# Patient Record
Sex: MALE | Race: BLACK OR AFRICAN AMERICAN | Employment: OTHER | URBAN - METROPOLITAN AREA
[De-identification: names, ages, dates, MRNs, and addresses within clinical notes are randomized per-mention and may not be internally consistent; named-entity substitution may affect disease eponyms.]

---

## 2018-03-22 ENCOUNTER — OP HISTORICAL/CONVERTED ENCOUNTER (OUTPATIENT)
Dept: OTHER | Age: 78
End: 2018-03-22

## 2019-10-18 ENCOUNTER — OP HISTORICAL/CONVERTED ENCOUNTER (OUTPATIENT)
Dept: OTHER | Age: 79
End: 2019-10-18

## 2020-08-04 DIAGNOSIS — N40.1 BENIGN PROSTATIC HYPERPLASIA WITH LOWER URINARY TRACT SYMPTOMS: ICD-10-CM

## 2020-08-10 VITALS
HEART RATE: 69 BPM | OXYGEN SATURATION: 98 % | SYSTOLIC BLOOD PRESSURE: 163 MMHG | DIASTOLIC BLOOD PRESSURE: 61 MMHG | HEIGHT: 72 IN | BODY MASS INDEX: 19.91 KG/M2 | WEIGHT: 147 LBS | TEMPERATURE: 97.8 F | RESPIRATION RATE: 17 BRPM

## 2020-08-10 PROBLEM — M75.120 FULL THICKNESS ROTATOR CUFF TEAR: Status: ACTIVE | Noted: 2017-06-05

## 2020-08-10 PROBLEM — N40.0 BENIGN PROSTATIC HYPERPLASIA: Status: ACTIVE | Noted: 2017-06-05

## 2020-08-10 PROBLEM — H40.9 GLAUCOMA OF LEFT EYE: Status: ACTIVE | Noted: 2017-12-29

## 2020-08-10 PROBLEM — I10 ESSENTIAL HYPERTENSION: Status: ACTIVE | Noted: 2017-06-05

## 2020-08-10 PROBLEM — K40.90 LEFT INGUINAL HERNIA: Status: ACTIVE | Noted: 2017-06-05

## 2020-08-10 RX ORDER — LISINOPRIL AND HYDROCHLOROTHIAZIDE 10; 12.5 MG/1; MG/1
1 TABLET ORAL DAILY
COMMUNITY
Start: 2020-07-16 | End: 2020-08-14 | Stop reason: SDUPTHER

## 2020-08-10 RX ORDER — DUTASTERIDE AND TAMSULOSIN HYDROCHLORIDE CAPSULES .5; .4 MG/1; MG/1
CAPSULE ORAL
COMMUNITY
Start: 2020-05-06 | End: 2020-09-10 | Stop reason: SDUPTHER

## 2020-08-11 ENCOUNTER — OFFICE VISIT (OUTPATIENT)
Dept: PRIMARY CARE CLINIC | Age: 80
End: 2020-08-11
Payer: MEDICARE

## 2020-08-11 VITALS
DIASTOLIC BLOOD PRESSURE: 75 MMHG | TEMPERATURE: 97.4 F | HEART RATE: 74 BPM | HEIGHT: 72 IN | BODY MASS INDEX: 19.81 KG/M2 | RESPIRATION RATE: 18 BRPM | OXYGEN SATURATION: 99 % | WEIGHT: 146.25 LBS | SYSTOLIC BLOOD PRESSURE: 146 MMHG

## 2020-08-11 DIAGNOSIS — M79.641 RIGHT HAND PAIN: ICD-10-CM

## 2020-08-11 DIAGNOSIS — I10 ESSENTIAL HYPERTENSION: Primary | ICD-10-CM

## 2020-08-11 DIAGNOSIS — Z12.5 SCREENING FOR PROSTATE CANCER: ICD-10-CM

## 2020-08-11 DIAGNOSIS — M79.671 PAIN IN BOTH FEET: ICD-10-CM

## 2020-08-11 DIAGNOSIS — M79.672 PAIN IN BOTH FEET: ICD-10-CM

## 2020-08-11 DIAGNOSIS — N40.1 BENIGN PROSTATIC HYPERPLASIA WITH LOWER URINARY TRACT SYMPTOMS, SYMPTOM DETAILS UNSPECIFIED: ICD-10-CM

## 2020-08-11 PROCEDURE — G8753 SYS BP > OR = 140: HCPCS | Performed by: FAMILY MEDICINE

## 2020-08-11 PROCEDURE — G8510 SCR DEP NEG, NO PLAN REQD: HCPCS | Performed by: FAMILY MEDICINE

## 2020-08-11 PROCEDURE — 99214 OFFICE O/P EST MOD 30 MIN: CPT | Performed by: FAMILY MEDICINE

## 2020-08-11 PROCEDURE — G8420 CALC BMI NORM PARAMETERS: HCPCS | Performed by: FAMILY MEDICINE

## 2020-08-11 PROCEDURE — G8754 DIAS BP LESS 90: HCPCS | Performed by: FAMILY MEDICINE

## 2020-08-11 PROCEDURE — G8427 DOCREV CUR MEDS BY ELIG CLIN: HCPCS | Performed by: FAMILY MEDICINE

## 2020-08-11 RX ORDER — GABAPENTIN 100 MG/1
2 CAPSULE ORAL DAILY
COMMUNITY

## 2020-08-11 RX ORDER — ASPIRIN 81 MG/1
81 TABLET ORAL DAILY
COMMUNITY

## 2020-08-11 NOTE — PROGRESS NOTES
Chester Fajardo is a [de-identified] y.o. male who presents today for the following:  Chief Complaint   Patient presents with    Hypertension    Medication Refill    Labs    Hand Injury     States was cutting grass with push mower about 6 weeks ago and mower got stuck in hole and when pulling mower out he hurt his right hand. This patient comes in today for follow up of the following medical conditions: BPH and Hypertension They also have some new problems including: pain in both feet when he is in the cold. He he was mowing his lawn and had his right finger pulled and he has had tenderness in the right index finger. No Known Allergies    Current Outpatient Medications   Medication Sig    aspirin delayed-release 81 mg tablet Take 81 mg by mouth daily.  Dutasteride-Tamsulosin 0.5-0.4 mg CM24 TAKE 1 CAPSULE BY MOUTH EVERY DAY    lisinopril-hydroCHLOROthiazide (PRINZIDE, ZESTORETIC) 10-12.5 mg per tablet Take 1 Tab by mouth daily.  gabapentin (NEURONTIN) 100 mg capsule gabapentin 100 mg capsule     No current facility-administered medications for this visit.         Past Medical History:   Diagnosis Date    Benign prostatic hyperplasia 6/5/2017    Essential hypertension 6/5/2017    Full thickness rotator cuff tear 6/5/2017    Glaucoma of left eye 12/29/2017    Left inguinal hernia 6/5/2017       Past Surgical History:   Procedure Laterality Date    HX CHOLECYSTECTOMY      HX HERNIA REPAIR  05/31/2016    HX ORTHOPAEDIC Left     Shoulder    HX ORTHOPAEDIC Right     Shoulder    HX ORTHOPAEDIC Right 03/22/2018    Hammer toe surgery        Family History   Problem Relation Age of Onset    Heart Disease Mother     Cancer Brother        Social History     Socioeconomic History    Marital status:      Spouse name: Not on file    Number of children: Not on file    Years of education: Not on file    Highest education level: Not on file   Occupational History    Not on file   Social Needs    Financial resource strain: Not on file    Food insecurity     Worry: Not on file     Inability: Not on file    Transportation needs     Medical: Not on file     Non-medical: Not on file   Tobacco Use    Smoking status: Former Smoker    Smokeless tobacco: Never Used   Substance and Sexual Activity    Alcohol use: Never     Frequency: Never    Drug use: Never    Sexual activity: Not on file   Lifestyle    Physical activity     Days per week: Not on file     Minutes per session: Not on file    Stress: Not on file   Relationships    Social connections     Talks on phone: Not on file     Gets together: Not on file     Attends Orthodox service: Not on file     Active member of club or organization: Not on file     Attends meetings of clubs or organizations: Not on file     Relationship status: Not on file    Intimate partner violence     Fear of current or ex partner: Not on file     Emotionally abused: Not on file     Physically abused: Not on file     Forced sexual activity: Not on file   Other Topics Concern    Not on file   Social History Narrative    Not on file         Review of Systems   Constitutional: Negative. Respiratory: Negative. Cardiovascular: Negative. Gastrointestinal: Negative. Genitourinary: Negative. Musculoskeletal: Positive for joint pain (Right index finger and both feet). Skin: Negative. Neurological: Negative. Psychiatric/Behavioral: Negative. All other systems reviewed and are negative. Visit Vitals  /75 (BP 1 Location: Right arm, BP Patient Position: Sitting)   Pulse 74   Temp 97.4 °F (36.3 °C) (Oral)   Resp 18   Ht 6' (1.829 m)   Wt 146 lb 4 oz (66.3 kg)   SpO2 99%   BMI 19.84 kg/m²     Physical Exam  Vitals signs and nursing note reviewed. Constitutional:       Appearance: Normal appearance. He is normal weight. HENT:      Head: Normocephalic and atraumatic. Neck:      Musculoskeletal: Normal range of motion and neck supple. Cardiovascular:      Rate and Rhythm: Normal rate and regular rhythm. Pulses: Normal pulses. Heart sounds: Normal heart sounds. Pulmonary:      Effort: Pulmonary effort is normal.      Breath sounds: Normal breath sounds. Abdominal:      General: Abdomen is flat. Bowel sounds are normal.      Palpations: Abdomen is soft. Musculoskeletal: Normal range of motion. Skin:     General: Skin is warm and dry. Capillary Refill: Capillary refill takes less than 2 seconds. Neurological:      General: No focal deficit present. Mental Status: He is alert. Sensory: Sensation is intact. Motor: Motor function is intact. Psychiatric:         Mood and Affect: Mood normal.         Behavior: Behavior normal.         Thought Content: Thought content normal.         Judgment: Judgment normal.              1. Essential hypertension  Blood pressure is controlled and he is taking his medication without side effect    2. Benign prostatic hyperplasia with lower urinary tract symptoms, symptom details unspecified  The dual medication we have been using has helped his symptoms and he has no problem urinating. 3. Pain in both feet  I checked his feet and the pulse is good and sensation is good. At this point he may simply be having cold feet when the weather is cold. Suggest that he keep socks on when he is home and to call if the symptoms worsen.   Since the gabapentin does help the discomfort there may be some relationship to his lumbar radiculopathy

## 2020-08-11 NOTE — PATIENT INSTRUCTIONS
High Blood Pressure: Care Instructions Overview It's normal for blood pressure to go up and down throughout the day. But if it stays up, you have high blood pressure. Another name for high blood pressure is hypertension. Despite what a lot of people think, high blood pressure usually doesn't cause headaches or make you feel dizzy or lightheaded. It usually has no symptoms. But it does increase your risk of stroke, heart attack, and other problems. You and your doctor will talk about your risks of these problems based on your blood pressure. Your doctor will give you a goal for your blood pressure. Your goal will be based on your health and your age. Lifestyle changes, such as eating healthy and being active, are always important to help lower blood pressure. You might also take medicine to reach your blood pressure goal. 
Follow-up care is a key part of your treatment and safety. Be sure to make and go to all appointments, and call your doctor if you are having problems. It's also a good idea to know your test results and keep a list of the medicines you take. How can you care for yourself at home? Medical treatment · If you stop taking your medicine, your blood pressure will go back up. You may take one or more types of medicine to lower your blood pressure. Be safe with medicines. Take your medicine exactly as prescribed. Call your doctor if you think you are having a problem with your medicine. · Talk to your doctor before you start taking aspirin every day. Aspirin can help certain people lower their risk of a heart attack or stroke. But taking aspirin isn't right for everyone, because it can cause serious bleeding. · See your doctor regularly. You may need to see the doctor more often at first or until your blood pressure comes down. · If you are taking blood pressure medicine, talk to your doctor before you take decongestants or anti-inflammatory medicine, such as ibuprofen. Some of these medicines can raise blood pressure. · Learn how to check your blood pressure at home. Lifestyle changes · Stay at a healthy weight. This is especially important if you put on weight around the waist. Losing even 10 pounds can help you lower your blood pressure. · If your doctor recommends it, get more exercise. Walking is a good choice. Bit by bit, increase the amount you walk every day. Try for at least 30 minutes on most days of the week. You also may want to swim, bike, or do other activities. · Avoid or limit alcohol. Talk to your doctor about whether you can drink any alcohol. · Try to limit how much sodium you eat to less than 2,300 milligrams (mg) a day. Your doctor may ask you to try to eat less than 1,500 mg a day. · Eat plenty of fruits (such as bananas and oranges), vegetables, legumes, whole grains, and low-fat dairy products. · Lower the amount of saturated fat in your diet. Saturated fat is found in animal products such as milk, cheese, and meat. Limiting these foods may help you lose weight and also lower your risk for heart disease. · Do not smoke. Smoking increases your risk for heart attack and stroke. If you need help quitting, talk to your doctor about stop-smoking programs and medicines. These can increase your chances of quitting for good. When should you call for help? Call  911 anytime you think you may need emergency care. This may mean having symptoms that suggest that your blood pressure is causing a serious heart or blood vessel problem. Your blood pressure may be over 180/120. For example, call 911 if: 
· You have symptoms of a heart attack. These may include: 
? Chest pain or pressure, or a strange feeling in the chest. 
? Sweating. ? Shortness of breath. ? Nausea or vomiting. ? Pain, pressure, or a strange feeling in the back, neck, jaw, or upper belly or in one or both shoulders or arms. ? Lightheadedness or sudden weakness. ? A fast or irregular heartbeat. · You have symptoms of a stroke. These may include: 
? Sudden numbness, tingling, weakness, or loss of movement in your face, arm, or leg, especially on only one side of your body. ? Sudden vision changes. ? Sudden trouble speaking. ? Sudden confusion or trouble understanding simple statements. ? Sudden problems with walking or balance. ? A sudden, severe headache that is different from past headaches. · You have severe back or belly pain. Do not wait until your blood pressure comes down on its own. Get help right away. Call your doctor now or seek immediate care if: 
· Your blood pressure is much higher than normal (such as 180/120 or higher), but you don't have symptoms. · You think high blood pressure is causing symptoms, such as: 
? Severe headache. 
? Blurry vision. Watch closely for changes in your health, and be sure to contact your doctor if: 
· Your blood pressure measures higher than your doctor recommends at least 2 times. That means the top number is higher or the bottom number is higher, or both. · You think you may be having side effects from your blood pressure medicine. Where can you learn more? Go to http://www.gray.com/ Enter H809 in the search box to learn more about \"High Blood Pressure: Care Instructions. \" Current as of: December 16, 2019               Content Version: 12.5 © 0581-0781 [employer]. Care instructions adapted under license by [employer] (which disclaims liability or warranty for this information). If you have questions about a medical condition or this instruction, always ask your healthcare professional. [place] [address] any warranty or liability for your use of this information. Foot Pain: Care Instructions Your Care Instructions Foot injuries that cause pain and swelling are fairly common.  Almost all sports or home repair projects can cause a misstep that ends up as foot pain. Normal wear and tear, especially as you get older, also can cause foot pain. Most minor foot injuries will heal on their own, and home treatment is usually all you need to do. If you have a severe injury, you may need tests and treatment. Follow-up care is a key part of your treatment and safety. Be sure to make and go to all appointments, and call your doctor if you are having problems. It's also a good idea to know your test results and keep a list of the medicines you take. How can you care for yourself at home? · Take pain medicines exactly as directed. ? If the doctor gave you a prescription medicine for pain, take it as prescribed. ? If you are not taking a prescription pain medicine, ask your doctor if you can take an over-the-counter medicine. · Rest and protect your foot. Take a break from any activity that may cause pain. · Put ice or a cold pack on your foot for 10 to 20 minutes at a time. Put a thin cloth between the ice and your skin. · Prop up the sore foot on a pillow when you ice it or anytime you sit or lie down during the next 3 days. Try to keep it above the level of your heart. This will help reduce swelling. · Your doctor may recommend that you wrap your foot with an elastic bandage. Keep your foot wrapped for as long as your doctor advises. · If your doctor recommends crutches, use them as directed. · Wear roomy footwear. · As soon as pain and swelling end, begin gentle exercises of your foot. Your doctor can tell you which exercises will help. When should you call for help? [id number] anytime you think you may need emergency care. For example, call if: 
· Your foot turns pale, white, blue, or cold. Call your doctor now or seek immediate medical care if: 
· You cannot move or stand on your foot. · Your foot looks twisted or out of its normal position. · Your foot is not stable when you step down. · You have signs of infection, such as: 
? Increased pain, swelling, warmth, or redness. ? Red streaks leading from the sore area. ? Pus draining from a place on your foot. ? A fever. · Your foot is numb or tingly. Watch closely for changes in your health, and be sure to contact your doctor if: 
· You do not get better as expected. · You have bruises from an injury that last longer than 2 weeks. Where can you learn more? Go to http://yanna-teresa.info/ Enter I851 in the search box to learn more about \"Foot Pain: Care Instructions. \" Current as of: March 2, 2020               Content Version: 12.5 © 1568-2514 [employer]wise[employer]. Care instructions adapted under license by [employer] (which disclaims liability or warranty for this information). If you have questions about a medical condition or this instruction, always ask your healthcare professional. [address] any warranty or liability for your use of this information.

## 2020-08-12 LAB
ALBUMIN SERPL-MCNC: 4.4 G/DL (ref 3.7–4.7)
ALBUMIN/GLOB SERPL: 2.2 {RATIO} (ref 1.2–2.2)
ALP SERPL-CCNC: 71 IU/L (ref 39–117)
ALT SERPL-CCNC: 6 IU/L (ref 0–44)
APPEARANCE UR: CLEAR
AST SERPL-CCNC: 15 IU/L (ref 0–40)
BASOPHILS # BLD AUTO: 0 X10E3/UL (ref 0–0.2)
BASOPHILS NFR BLD AUTO: 1 %
BILIRUB SERPL-MCNC: 0.4 MG/DL (ref 0–1.2)
BILIRUB UR QL STRIP: NEGATIVE
BUN SERPL-MCNC: 14 MG/DL (ref 8–27)
BUN/CREAT SERPL: 13 (ref 10–24)
CALCIUM SERPL-MCNC: 10 MG/DL (ref 8.6–10.2)
CHLORIDE SERPL-SCNC: 99 MMOL/L (ref 96–106)
CHOLEST SERPL-MCNC: 166 MG/DL (ref 100–199)
CO2 SERPL-SCNC: 26 MMOL/L (ref 20–29)
COLOR UR: YELLOW
CREAT SERPL-MCNC: 1.1 MG/DL (ref 0.76–1.27)
EOSINOPHIL # BLD AUTO: 0.1 X10E3/UL (ref 0–0.4)
EOSINOPHIL NFR BLD AUTO: 3 %
ERYTHROCYTE [DISTWIDTH] IN BLOOD BY AUTOMATED COUNT: 13 % (ref 11.6–15.4)
GLOBULIN SER CALC-MCNC: 2 G/DL (ref 1.5–4.5)
GLUCOSE SERPL-MCNC: 101 MG/DL (ref 65–99)
GLUCOSE UR QL: NEGATIVE
HCT VFR BLD AUTO: 42.3 % (ref 37.5–51)
HDLC SERPL-MCNC: 51 MG/DL
HGB BLD-MCNC: 14.4 G/DL (ref 13–17.7)
HGB UR QL STRIP: NEGATIVE
IMM GRANULOCYTES # BLD AUTO: 0 X10E3/UL (ref 0–0.1)
IMM GRANULOCYTES NFR BLD AUTO: 0 %
KETONES UR QL STRIP: NEGATIVE
LDLC SERPL CALC-MCNC: 92 MG/DL (ref 0–99)
LEUKOCYTE ESTERASE UR QL STRIP: NEGATIVE
LYMPHOCYTES # BLD AUTO: 0.7 X10E3/UL (ref 0.7–3.1)
LYMPHOCYTES NFR BLD AUTO: 18 %
MCH RBC QN AUTO: 31.4 PG (ref 26.6–33)
MCHC RBC AUTO-ENTMCNC: 34 G/DL (ref 31.5–35.7)
MCV RBC AUTO: 92 FL (ref 79–97)
MICRO URNS: NORMAL
MONOCYTES # BLD AUTO: 0.4 X10E3/UL (ref 0.1–0.9)
MONOCYTES NFR BLD AUTO: 11 %
NEUTROPHILS # BLD AUTO: 2.7 X10E3/UL (ref 1.4–7)
NEUTROPHILS NFR BLD AUTO: 67 %
NITRITE UR QL STRIP: NEGATIVE
PH UR STRIP: 7.5 [PH] (ref 5–7.5)
PLATELET # BLD AUTO: 162 X10E3/UL (ref 150–450)
POTASSIUM SERPL-SCNC: 4.3 MMOL/L (ref 3.5–5.2)
PROT SERPL-MCNC: 6.4 G/DL (ref 6–8.5)
PROT UR QL STRIP: NEGATIVE
PSA SERPL-MCNC: 1.2 NG/ML (ref 0–4)
RBC # BLD AUTO: 4.58 X10E6/UL (ref 4.14–5.8)
SODIUM SERPL-SCNC: 139 MMOL/L (ref 134–144)
SP GR UR: 1.01 (ref 1–1.03)
TRIGL SERPL-MCNC: 114 MG/DL (ref 0–149)
URATE SERPL-MCNC: 4.3 MG/DL (ref 3.7–8.6)
UROBILINOGEN UR STRIP-MCNC: 0.2 MG/DL (ref 0.2–1)
VLDLC SERPL CALC-MCNC: 23 MG/DL (ref 5–40)
WBC # BLD AUTO: 3.9 X10E3/UL (ref 3.4–10.8)

## 2020-08-13 RX ORDER — DUTASTERIDE AND TAMSULOSIN HYDROCHLORIDE CAPSULES .5; .4 MG/1; MG/1
CAPSULE ORAL
Qty: 90 CAP | Refills: 1 | Status: SHIPPED | OUTPATIENT
Start: 2020-08-13 | End: 2020-12-31 | Stop reason: SDUPTHER

## 2020-09-10 ENCOUNTER — OFFICE VISIT (OUTPATIENT)
Dept: SURGERY | Age: 80
End: 2020-09-10
Payer: MEDICARE

## 2020-09-10 VITALS
BODY MASS INDEX: 19.1 KG/M2 | DIASTOLIC BLOOD PRESSURE: 52 MMHG | HEART RATE: 76 BPM | HEIGHT: 72 IN | WEIGHT: 141 LBS | SYSTOLIC BLOOD PRESSURE: 162 MMHG | OXYGEN SATURATION: 100 % | TEMPERATURE: 97.9 F

## 2020-09-10 DIAGNOSIS — K40.90 RIGHT INGUINAL HERNIA: Primary | ICD-10-CM

## 2020-09-10 PROCEDURE — 99203 OFFICE O/P NEW LOW 30 MIN: CPT | Performed by: COLON & RECTAL SURGERY

## 2020-09-10 NOTE — PROGRESS NOTES
OFFICE VISIT NOTE    Maggy Mckenzie is a [de-identified] y.o. male who presents to the office today for:    Chief Complaint   Patient presents with    Possible Hernia     History of present illness:    Mr. Crystal Pitts is an 43-year-old gentleman who comes in today complaining of a possible right inguinal hernia. He had a repair of a left inguinal hernia by myself in May 2016. He denies any complications or recurrence at his left inguinal hernia repair site. In terms of his right inguinal hernia he denies any obstructive symptoms. He states he does not particularly bother him but he notices the bulge there. He also complains of some painful bowel movements and constipation. He says this improved after he started taking enemas. I asked about the possibility oral laxatives and he stated the only thing that works for him is magnesium citrate which I recommended against as it does tend to dehydrate patients and is particularly ill-advised in elderly patients with his risk of renal injury. His health history is otherwise unchanged. He has a history of hypertension. He also has a history of peripheral neuropathy for which he is on gabapentin. He also inquired about colonoscopy as his last colonoscopy was 6 years ago by Dr. Nolvia Ordoñez.  He does have a personal history of colon polyps however his last colonoscopy had no polyps.   I spoke to Dr. Kyle Bianchi nurse who said that he can wait another 4 years for his colonoscopy      Past Medical History:   Diagnosis Date    Benign prostatic hyperplasia 6/5/2017    Essential hypertension 6/5/2017    Full thickness rotator cuff tear 6/5/2017    Glaucoma of left eye 12/29/2017    Left inguinal hernia 6/5/2017       Past Surgical History:   Procedure Laterality Date    HX CATARACT REMOVAL      HX CHOLECYSTECTOMY      HX HERNIA REPAIR  05/31/2016    HX ORTHOPAEDIC Left     Shoulder    HX ORTHOPAEDIC Right     Shoulder    HX ORTHOPAEDIC Right 03/22/2018    Hammer toe surgery        Family History   Problem Relation Age of Onset    Heart Disease Mother     Cancer Brother        Social History     Socioeconomic History    Marital status:      Spouse name: Not on file    Number of children: Not on file    Years of education: Not on file    Highest education level: Not on file   Occupational History    Not on file   Social Needs    Financial resource strain: Not on file    Food insecurity     Worry: Not on file     Inability: Not on file    Transportation needs     Medical: Not on file     Non-medical: Not on file   Tobacco Use    Smoking status: Former Smoker     Packs/day: 0.50    Smokeless tobacco: Never Used   Substance and Sexual Activity    Alcohol use: Never     Frequency: Never    Drug use: Never    Sexual activity: Not on file   Lifestyle    Physical activity     Days per week: Not on file     Minutes per session: Not on file    Stress: Not on file   Relationships    Social connections     Talks on phone: Not on file     Gets together: Not on file     Attends [employer] service: Not on file     Active member of club or organization: Not on file     Attends meetings of clubs or organizations: Not on file     Relationship status: Not on file    Intimate partner violence     Fear of current or ex partner: Not on file     Emotionally abused: Not on file     Physically abused: Not on file     Forced sexual activity: Not on file   Other Topics Concern    Not on file   Social History Narrative    Not on file       No Known Allergies        Review of Systems   Constitutional: Negative. HENT: Negative. Eyes: Negative. Respiratory: Negative. Cardiovascular: Negative. Gastrointestinal: Positive for constipation. Genitourinary: Negative. Musculoskeletal: Negative. Skin: Negative. Neurological: Negative.     Endo/Heme/Allergies: Negative. Psychiatric/Behavioral: Negative. /52 (BP 1 Location: Left arm, BP Patient Position: Sitting)   Pulse 76   Temp 97.9 °F (36.6 °C) (Temporal)   Ht 6' (1.829 m)   Wt 141 lb (64 kg)   SpO2 100%   BMI 19.12 kg/m²     Physical Exam  Constitutional:       Appearance: Normal appearance. He is normal weight. HENT:      Head: Normocephalic and atraumatic. Neck:      Musculoskeletal: Normal range of motion and neck supple. Cardiovascular:      Rate and Rhythm: Normal rate and regular rhythm. Heart sounds: Normal heart sounds. Pulmonary:      Breath sounds: Normal breath sounds. Abdominal:      General: Bowel sounds are normal. There is no distension. Palpations: Abdomen is soft. Tenderness: There is no abdominal tenderness. Hernia: A hernia (Right inguinal hernia, direct, reducible) is present. Genitourinary:     Penis: Normal.       Scrotum/Testes: Normal.      Comments: On rectal examination he has a grade 2/3 right anterior lateral hemorrhoid, he has obvious sequela of prior anal surgery; patient has had anal surgery for hemorrhoids in the past  Musculoskeletal: Normal range of motion. Skin:     General: Skin is warm and dry. Neurological:      General: No focal deficit present. Mental Status: He is alert and oriented to person, place, and time. Psychiatric:         Mood and Affect: Mood normal.         Thought Content: Thought content normal.         Judgment: Judgment normal.         Problem List Items Addressed This Visit        Other    Right inguinal hernia - Primary          Assessment and Plan:    In terms of his constipation I told him that he can continue with the enemas. Dr. Cata gaitan will be in touch when this time for his next colonoscopy. In terms of his right inguinal hernia I told him that since it is not bothering him he can wait a couple months until the [place] pandemic is hopefully over.   He is amenable to this plan will come back and see me in a couple months at that point hopefully we can schedule his surgery.             Ronak Lu MD

## 2020-09-17 RX ORDER — LISINOPRIL AND HYDROCHLOROTHIAZIDE 10; 12.5 MG/1; MG/1
1 TABLET ORAL DAILY
Qty: 30 TAB | Refills: 2 | Status: SHIPPED | OUTPATIENT
Start: 2020-09-17 | End: 2020-12-26

## 2020-11-12 ENCOUNTER — OFFICE VISIT (OUTPATIENT)
Dept: SURGERY | Age: 80
End: 2020-11-12
Payer: MEDICARE

## 2020-11-12 VITALS
BODY MASS INDEX: 20.45 KG/M2 | HEART RATE: 64 BPM | SYSTOLIC BLOOD PRESSURE: 162 MMHG | WEIGHT: 151 LBS | HEIGHT: 72 IN | DIASTOLIC BLOOD PRESSURE: 71 MMHG | TEMPERATURE: 97.1 F | OXYGEN SATURATION: 100 %

## 2020-11-12 DIAGNOSIS — K40.90 RIGHT INGUINAL HERNIA: Primary | ICD-10-CM

## 2020-11-12 PROCEDURE — G8753 SYS BP > OR = 140: HCPCS | Performed by: COLON & RECTAL SURGERY

## 2020-11-12 PROCEDURE — G8536 NO DOC ELDER MAL SCRN: HCPCS | Performed by: COLON & RECTAL SURGERY

## 2020-11-12 PROCEDURE — G8432 DEP SCR NOT DOC, RNG: HCPCS | Performed by: COLON & RECTAL SURGERY

## 2020-11-12 PROCEDURE — G8754 DIAS BP LESS 90: HCPCS | Performed by: COLON & RECTAL SURGERY

## 2020-11-12 PROCEDURE — G8427 DOCREV CUR MEDS BY ELIG CLIN: HCPCS | Performed by: COLON & RECTAL SURGERY

## 2020-11-12 PROCEDURE — 99214 OFFICE O/P EST MOD 30 MIN: CPT | Performed by: COLON & RECTAL SURGERY

## 2020-11-12 PROCEDURE — 1101F PT FALLS ASSESS-DOCD LE1/YR: CPT | Performed by: COLON & RECTAL SURGERY

## 2020-11-12 PROCEDURE — G8420 CALC BMI NORM PARAMETERS: HCPCS | Performed by: COLON & RECTAL SURGERY

## 2020-11-12 RX ORDER — CHLORHEXIDINE GLUCONATE 1.2 MG/ML
RINSE ORAL
COMMUNITY
Start: 2020-11-08 | End: 2021-02-23 | Stop reason: ALTCHOICE

## 2020-11-12 NOTE — PROGRESS NOTES
OFFICE VISIT NOTE    Elida Ewing is a [de-identified] y.o. male who presents to the office today for:    Chief Complaint   Patient presents with    Follow-up     Right inguinal hernia       Mr. Mitra Briones is an 78-year-old gentleman who comes in today for reevaluation of his right inguinal hernia. I saw him approximately 3 months ago and at that time he complained of a bulge in the right inguinal region. He has had a repair of a left inguinal hernia by myself in May 2016. At the time of the original visit he denied any obstructive symptoms. He denied any pain at the site. After discussion with him he wanted to wait until after the Covid pandemic was over to schedule surgery however he comes in today because he states he is having some more discomfort at the site. His health history is otherwise unchanged. He has a history of hypertension. He also has a history of peripheral neuropathy for which he is on gabapentin.           Past Medical History:   Diagnosis Date    Benign prostatic hyperplasia 6/5/2017    Essential hypertension 6/5/2017    Full thickness rotator cuff tear 6/5/2017    Glaucoma of left eye 12/29/2017    Left inguinal hernia 6/5/2017       Past Surgical History:   Procedure Laterality Date    HX CATARACT REMOVAL      HX CHOLECYSTECTOMY      HX HERNIA REPAIR  05/31/2016    Dr. Raúl Mckay HX ORTHOPAEDIC Left     Shoulder    HX ORTHOPAEDIC Right     Shoulder    HX ORTHOPAEDIC Right 03/22/2018    Hammer toe surgery        Family History   Problem Relation Age of Onset    Heart Disease Mother     Cancer Brother        Social History     Socioeconomic History    Marital status:      Spouse name: Not on file    Number of children: Not on file    Years of education: Not on file    Highest education level: Not on file   Occupational History    Not on file   Social Needs    Financial resource strain: Not on file    Food insecurity     Worry: Not on file     Inability: Not on file    Transportation needs     Medical: Not on file     Non-medical: Not on file   Tobacco Use    Smoking status: Former Smoker     Packs/day: 0.50     Years: 10.00     Pack years: 5.00     Last attempt to quit: 1980     Years since quittin.8    Smokeless tobacco: Never Used   Substance and Sexual Activity    Alcohol use: Never     Frequency: Never    Drug use: Never    Sexual activity: Not on file   Lifestyle    Physical activity     Days per week: Not on file     Minutes per session: Not on file    Stress: Not on file   Relationships    Social connections     Talks on phone: Not on file     Gets together: Not on file     Attends Taoism service: Not on file     Active member of club or organization: Not on file     Attends meetings of clubs or organizations: Not on file     Relationship status: Not on file    Intimate partner violence     Fear of current or ex partner: Not on file     Emotionally abused: Not on file     Physically abused: Not on file     Forced sexual activity: Not on file   Other Topics Concern    Not on file   Social History Narrative    Not on file       No Known Allergies    Current Outpatient Medications   Medication Sig    chlorhexidine (PERIDEX) 0.12 % solution     lisinopril-hydroCHLOROthiazide (PRINZIDE, ZESTORETIC) 10-12.5 mg per tablet Take 1 Tab by mouth daily.  Dutasteride-Tamsulosin 0.5-0.4 mg CM24 TAKE 1 CAPSULE BY MOUTH EVERY DAY    aspirin delayed-release 81 mg tablet Take 81 mg by mouth daily.  gabapentin (NEURONTIN) 100 mg capsule gabapentin 100 mg capsule     No current facility-administered medications for this visit. Review of Systems   Constitutional: Negative. HENT: Negative. Eyes: Negative. Respiratory: Negative. Cardiovascular: Negative. Gastrointestinal: Positive for constipation. Genitourinary: Negative. Musculoskeletal: Negative. Skin: Negative. Neurological: Negative. Endo/Heme/Allergies: Negative. Psychiatric/Behavioral: Negative. BP (!) 162/71 (BP 1 Location: Left arm, BP Patient Position: Sitting)   Pulse 64   Temp 97.1 °F (36.2 °C)   Ht 6' (1.829 m)   Wt 151 lb (68.5 kg)   SpO2 100%   BMI 20.48 kg/m²     Physical Exam  Constitutional:       Appearance: Normal appearance. He is normal weight. HENT:      Head: Normocephalic and atraumatic. Neck:      Musculoskeletal: Normal range of motion and neck supple. Cardiovascular:      Rate and Rhythm: Normal rate and regular rhythm. Heart sounds: Normal heart sounds. Pulmonary:      Breath sounds: Normal breath sounds. Abdominal:      General: Bowel sounds are normal. There is no distension. Palpations: Abdomen is soft. Tenderness: There is no abdominal tenderness. Hernia: A hernia (Right inguinal hernia, direct, reducible) is present. Comments: Right inguinal hernia reducible   Genitourinary:     Penis: Normal.       Scrotum/Testes: Normal.      Comments: On rectal examination he has a grade 2/3 right anterior lateral hemorrhoid, he has obvious sequela of prior anal surgery; patient has had anal surgery for hemorrhoids in the past  Musculoskeletal: Normal range of motion. Skin:     General: Skin is warm and dry. Neurological:      General: No focal deficit present. Mental Status: He is alert and oriented to person, place, and time. Psychiatric:         Mood and Affect: Mood normal.         Thought Content: Thought content normal.         Judgment: Judgment normal.         Problem List Items Addressed This Visit        Other    Right inguinal hernia - Primary          Assessment and Plan:  I told Mr. Mauro Melendrez of my examination his hernia has increased in size. I recommended repair. I reviewed the procedure with them which would be repair of the right inguinal hernia with mesh.   I reviewed the risks and benefits and the risk of infection, bleeding, recurrence, mesh complications, injury to cord structures, testicular atrophy and/or loss. Mr. Ilana Stephenson wishes to proceed with surgery and his surgery has been scheduled for November 30, 2020. Consent was obtained today in the office.             Marisela Leary MD

## 2020-11-12 NOTE — H&P (VIEW-ONLY)
OFFICE VISIT NOTE Duane Hickman is a [de-identified] y.o. male who presents to the office today for: Chief Complaint Patient presents with  Follow-up Right inguinal hernia Mr. Jeremie Tirado is an 80-year-old gentleman who comes in today for reevaluation of his right inguinal hernia. I saw him approximately 3 months ago and at that time he complained of a bulge in the right inguinal region. He has had a repair of a left inguinal hernia by myself in May 2016. At the time of the original visit he denied any obstructive symptoms. He denied any pain at the site. After discussion with him he wanted to wait until after the Covid pandemic was over to schedule surgery however he comes in today because he states he is having some more discomfort at the site. His health history is otherwise unchanged. He has a history of hypertension. He also has a history of peripheral neuropathy for which he is on gabapentin. Past Medical History:  
Diagnosis Date  Benign prostatic hyperplasia 6/5/2017  Essential hypertension 6/5/2017  Full thickness rotator cuff tear 6/5/2017  Glaucoma of left eye 12/29/2017  Left inguinal hernia 6/5/2017 Past Surgical History:  
Procedure Laterality Date  HX CATARACT REMOVAL    
 HX CHOLECYSTECTOMY  HX HERNIA REPAIR  05/31/2016 Dr. Keisha Piper  HX ORTHOPAEDIC Left Shoulder  HX ORTHOPAEDIC Right Shoulder  HX ORTHOPAEDIC Right 03/22/2018 Hammer toe surgery Family History Problem Relation Age of Onset  Heart Disease Mother  Cancer Brother Social History Socioeconomic History  Marital status:  Spouse name: Not on file  Number of children: Not on file  Years of education: Not on file  Highest education level: Not on file Occupational History  Not on file Social Needs  Financial resource strain: Not on file  Food insecurity Worry: Not on file Inability: Not on file  Transportation needs Medical: Not on file Non-medical: Not on file Tobacco Use  Smoking status: Former Smoker Packs/day: 0.50 Years: 10.00 Pack years: 5.00 Last attempt to quit: 1980 Years since quittin.8  Smokeless tobacco: Never Used Substance and Sexual Activity  Alcohol use: Never Frequency: Never  Drug use: Never  Sexual activity: Not on file Lifestyle  Physical activity Days per week: Not on file Minutes per session: Not on file  Stress: Not on file Relationships  Social connections Talks on phone: Not on file Gets together: Not on file Attends Caodaism service: Not on file Active member of club or organization: Not on file Attends meetings of clubs or organizations: Not on file Relationship status: Not on file  Intimate partner violence Fear of current or ex partner: Not on file Emotionally abused: Not on file Physically abused: Not on file Forced sexual activity: Not on file Other Topics Concern  Not on file Social History Narrative  Not on file No Known Allergies Current Outpatient Medications Medication Sig  chlorhexidine (PERIDEX) 0.12 % solution  lisinopril-hydroCHLOROthiazide (PRINZIDE, ZESTORETIC) 10-12.5 mg per tablet Take 1 Tab by mouth daily.  Dutasteride-Tamsulosin 0.5-0.4 mg CM24 TAKE 1 CAPSULE BY MOUTH EVERY DAY  aspirin delayed-release 81 mg tablet Take 81 mg by mouth daily.  gabapentin (NEURONTIN) 100 mg capsule gabapentin 100 mg capsule No current facility-administered medications for this visit. Review of Systems Constitutional: Negative. HENT: Negative. Eyes: Negative. Respiratory: Negative. Cardiovascular: Negative. Gastrointestinal: Positive for constipation. Genitourinary: Negative. Musculoskeletal: Negative. Skin: Negative. Neurological: Negative. Endo/Heme/Allergies: Negative. Psychiatric/Behavioral: Negative. BP (!) 162/71 (BP 1 Location: Left arm, BP Patient Position: Sitting)   Pulse 64   Temp 97.1 °F (36.2 °C)   Ht 6' (1.829 m)   Wt 151 lb (68.5 kg)   SpO2 100%   BMI 20.48 kg/m² Physical Exam 
Constitutional:   
   Appearance: Normal appearance. He is normal weight. HENT:  
   Head: Normocephalic and atraumatic. Neck: Musculoskeletal: Normal range of motion and neck supple. Cardiovascular:  
   Rate and Rhythm: Normal rate and regular rhythm. Heart sounds: Normal heart sounds. Pulmonary:  
   Breath sounds: Normal breath sounds. Abdominal:  
   General: Bowel sounds are normal. There is no distension. Palpations: Abdomen is soft. Tenderness: There is no abdominal tenderness. Hernia: A hernia (Right inguinal hernia, direct, reducible) is present. Comments: Right inguinal hernia reducible Genitourinary: 
   Penis: Normal.   
   Scrotum/Testes: Normal.  
   Comments: On rectal examination he has a grade 2/3 right anterior lateral hemorrhoid, he has obvious sequela of prior anal surgery; patient has had anal surgery for hemorrhoids in the past 
Musculoskeletal: Normal range of motion. Skin: 
   General: Skin is warm and dry. Neurological:  
   General: No focal deficit present. Mental Status: He is alert and oriented to person, place, and time. Psychiatric:     
   Mood and Affect: Mood normal.     
   Thought Content: Thought content normal.     
   Judgment: Judgment normal.  
 
 
 
Problem List Items Addressed This Visit Other Right inguinal hernia - Primary Assessment and Plan: I told Mr. Diana Thompson of my examination his hernia has increased in size. I recommended repair.   I reviewed the procedure with them which would be repair of the right inguinal hernia with mesh. I reviewed the risks and benefits and the risk of infection, bleeding, recurrence, mesh complications, injury to cord structures, testicular atrophy and/or loss. Mr. Mauro Melendrez wishes to proceed with surgery and his surgery has been scheduled for November 30, 2020. Consent was obtained today in the office.  
 
 
 
 
 
Radha Johnson MD

## 2020-11-18 ENCOUNTER — ANESTHESIA EVENT (OUTPATIENT)
Dept: SURGERY | Age: 80
End: 2020-11-18
Payer: MEDICARE

## 2020-11-23 ENCOUNTER — HOSPITAL ENCOUNTER (OUTPATIENT)
Dept: PREADMISSION TESTING | Age: 80
Discharge: HOME | End: 2020-11-23
Payer: MEDICARE

## 2020-11-23 VITALS
DIASTOLIC BLOOD PRESSURE: 79 MMHG | BODY MASS INDEX: 20.59 KG/M2 | HEART RATE: 73 BPM | WEIGHT: 152 LBS | TEMPERATURE: 97.7 F | HEIGHT: 72 IN | RESPIRATION RATE: 20 BRPM | SYSTOLIC BLOOD PRESSURE: 150 MMHG | OXYGEN SATURATION: 99 %

## 2020-11-23 LAB
ANION GAP SERPL CALC-SCNC: 6 MMOL/L (ref 5–15)
ATRIAL RATE: 62 BPM
BUN SERPL-MCNC: 17 MG/DL (ref 6–20)
BUN/CREAT SERPL: 16 (ref 12–20)
CA-I BLD-MCNC: 9.5 MG/DL (ref 8.5–10.1)
CALCULATED P AXIS, ECG09: 58 DEGREES
CALCULATED R AXIS, ECG10: -85 DEGREES
CALCULATED T AXIS, ECG11: 63 DEGREES
CHLORIDE SERPL-SCNC: 102 MMOL/L (ref 97–108)
CO2 SERPL-SCNC: 31 MMOL/L (ref 21–32)
CREAT SERPL-MCNC: 1.09 MG/DL (ref 0.7–1.3)
DIAGNOSIS, 93000: NORMAL
ERYTHROCYTE [DISTWIDTH] IN BLOOD BY AUTOMATED COUNT: 13.5 % (ref 11.5–14.5)
GLUCOSE SERPL-MCNC: 108 MG/DL (ref 65–100)
HCT VFR BLD AUTO: 42.6 % (ref 41–53)
HGB BLD-MCNC: 14.4 G/DL (ref 13.5–17.5)
MCH RBC QN AUTO: 32.4 PG (ref 31–34)
MCHC RBC AUTO-ENTMCNC: 33.8 G/DL (ref 31–36)
MCV RBC AUTO: 95.9 FL (ref 80–100)
NRBC # BLD: 0.01 K/UL
NRBC BLD-RTO: 20 PER 100 WBC
P-R INTERVAL, ECG05: 182 MS
PLATELET # BLD AUTO: 174 K/UL
PMV BLD AUTO: 6.6 FL (ref 6.5–11.5)
POTASSIUM SERPL-SCNC: 4 MMOL/L (ref 3.5–5.1)
Q-T INTERVAL, ECG07: 445 MS
QRS DURATION, ECG06: 143 MS
QTC CALCULATION (BEZET), ECG08: 449 MS
RBC # BLD AUTO: 4.45 M/UL (ref 4.5–5.9)
SARS-COV-2, COV2: NORMAL
SODIUM SERPL-SCNC: 139 MMOL/L (ref 136–145)
VENTRICULAR RATE, ECG03: 61 BPM
WBC # BLD AUTO: 4.5 K/UL (ref 4.4–11.3)

## 2020-11-23 PROCEDURE — 85027 COMPLETE CBC AUTOMATED: CPT

## 2020-11-23 PROCEDURE — 80048 BASIC METABOLIC PNL TOTAL CA: CPT

## 2020-11-23 PROCEDURE — 87635 SARS-COV-2 COVID-19 AMP PRB: CPT

## 2020-11-23 PROCEDURE — 93005 ELECTROCARDIOGRAM TRACING: CPT

## 2020-11-24 ENCOUNTER — OFFICE VISIT (OUTPATIENT)
Dept: PRIMARY CARE CLINIC | Age: 80
End: 2020-11-24
Payer: MEDICARE

## 2020-11-24 VITALS
HEIGHT: 72 IN | TEMPERATURE: 96.2 F | HEART RATE: 71 BPM | OXYGEN SATURATION: 97 % | BODY MASS INDEX: 20.59 KG/M2 | RESPIRATION RATE: 16 BRPM | SYSTOLIC BLOOD PRESSURE: 141 MMHG | WEIGHT: 152 LBS | DIASTOLIC BLOOD PRESSURE: 80 MMHG

## 2020-11-24 DIAGNOSIS — N40.1 BENIGN PROSTATIC HYPERPLASIA WITH LOWER URINARY TRACT SYMPTOMS, SYMPTOM DETAILS UNSPECIFIED: ICD-10-CM

## 2020-11-24 DIAGNOSIS — M54.2 CHRONIC NECK AND BACK PAIN: ICD-10-CM

## 2020-11-24 DIAGNOSIS — K40.90 RIGHT INGUINAL HERNIA: ICD-10-CM

## 2020-11-24 DIAGNOSIS — I10 ESSENTIAL HYPERTENSION: Primary | ICD-10-CM

## 2020-11-24 DIAGNOSIS — M54.9 CHRONIC NECK AND BACK PAIN: ICD-10-CM

## 2020-11-24 DIAGNOSIS — G89.29 CHRONIC NECK AND BACK PAIN: ICD-10-CM

## 2020-11-24 LAB — SARS-COV-2, COV2NT: NOT DETECTED

## 2020-11-24 PROCEDURE — G8753 SYS BP > OR = 140: HCPCS | Performed by: NURSE PRACTITIONER

## 2020-11-24 PROCEDURE — G8420 CALC BMI NORM PARAMETERS: HCPCS | Performed by: NURSE PRACTITIONER

## 2020-11-24 PROCEDURE — G8427 DOCREV CUR MEDS BY ELIG CLIN: HCPCS | Performed by: NURSE PRACTITIONER

## 2020-11-24 PROCEDURE — G8536 NO DOC ELDER MAL SCRN: HCPCS | Performed by: NURSE PRACTITIONER

## 2020-11-24 PROCEDURE — G8432 DEP SCR NOT DOC, RNG: HCPCS | Performed by: NURSE PRACTITIONER

## 2020-11-24 PROCEDURE — 1101F PT FALLS ASSESS-DOCD LE1/YR: CPT | Performed by: NURSE PRACTITIONER

## 2020-11-24 PROCEDURE — G8754 DIAS BP LESS 90: HCPCS | Performed by: NURSE PRACTITIONER

## 2020-11-24 PROCEDURE — 99214 OFFICE O/P EST MOD 30 MIN: CPT | Performed by: NURSE PRACTITIONER

## 2020-11-29 PROBLEM — M75.120 FULL THICKNESS ROTATOR CUFF TEAR: Status: RESOLVED | Noted: 2017-06-05 | Resolved: 2020-11-29

## 2020-11-29 PROBLEM — M54.9 CHRONIC NECK AND BACK PAIN: Status: ACTIVE | Noted: 2020-11-29

## 2020-11-29 PROBLEM — H40.9 GLAUCOMA OF LEFT EYE: Status: RESOLVED | Noted: 2017-12-29 | Resolved: 2020-11-29

## 2020-11-29 PROBLEM — M54.2 CHRONIC NECK AND BACK PAIN: Status: ACTIVE | Noted: 2020-11-29

## 2020-11-29 PROBLEM — K40.90 LEFT INGUINAL HERNIA: Status: RESOLVED | Noted: 2017-06-05 | Resolved: 2020-11-29

## 2020-11-29 PROBLEM — G89.29 CHRONIC NECK AND BACK PAIN: Status: ACTIVE | Noted: 2020-11-29

## 2020-11-29 NOTE — PROGRESS NOTES
Donny Redd is a [de-identified] y.o. male who presents to the office today for the following:    Chief Complaint   Patient presents with    Pre-op Exam    Hypertension    Back Pain    Neck Pain    Hernia (Non Specific)       Past Medical History:   Diagnosis Date    Benign prostatic hyperplasia 2017    Essential hypertension 2017    Full thickness rotator cuff tear 2017    Full thickness rotator cuff tear 2017    Glaucoma of left eye 2017    Glaucoma of left eye 2017    Left inguinal hernia 2017    Left inguinal hernia 2017       Past Surgical History:   Procedure Laterality Date    HX CATARACT REMOVAL      HX CHOLECYSTECTOMY      HX HERNIA REPAIR  2016    Dr. Meade Blood HX ORTHOPAEDIC Left     Shoulder    HX ORTHOPAEDIC Right     Shoulder    HX ORTHOPAEDIC Right 2018    Hammer toe surgery         Family History   Problem Relation Age of Onset    Heart Disease Mother     Cancer Brother         Social History     Tobacco Use    Smoking status: Former Smoker     Packs/day: 0.50     Years: 10.00     Pack years: 5.00     Last attempt to quit: 1980     Years since quittin.9    Smokeless tobacco: Never Used   Substance Use Topics    Alcohol use: Never     Frequency: Never    Drug use: Never        HPI  Patient here for pre-op clearance for right hernia repair under general anesthesia. Has PMH of multiple joint pain, hypertension and BPH that is controlled with medication. Is having pain in right groin  but otherwise has been feeling well. Reports no problems with anesthesia in past. Had EKG and labwork done at hospital 1 day ago. Current Outpatient Medications on File Prior to Visit   Medication Sig    chlorhexidine (PERIDEX) 0.12 % solution     lisinopril-hydroCHLOROthiazide (PRINZIDE, ZESTORETIC) 10-12.5 mg per tablet Take 1 Tab by mouth daily.     Dutasteride-Tamsulosin 0.5-0.4 mg CM24 TAKE 1 CAPSULE BY MOUTH EVERY DAY    aspirin delayed-release 81 mg tablet Take 81 mg by mouth daily.  gabapentin (NEURONTIN) 100 mg capsule gabapentin 100 mg capsule     No current facility-administered medications on file prior to visit. No orders of the defined types were placed in this encounter. Review of Systems   Constitutional: Negative. HENT: Negative. Eyes: Negative. Respiratory: Negative. Cardiovascular: Negative. Gastrointestinal: Negative for blood in stool, constipation, diarrhea, heartburn, melena, nausea and vomiting. Right groin   Genitourinary: Negative. Musculoskeletal: Positive for back pain, myalgias and neck pain. Skin: Negative. Neurological: Negative. Psychiatric/Behavioral: Negative. Visit Vitals  BP (!) 141/80 (BP 1 Location: Left arm, BP Patient Position: Sitting)   Pulse 71   Temp (!) 96.2 °F (35.7 °C) (Temporal)   Resp 16   Ht 6' (1.829 m)   Wt 152 lb (68.9 kg)   SpO2 97%   BMI 20.61 kg/m²       Physical Exam  Vitals signs and nursing note reviewed. Constitutional:       Appearance: Normal appearance. HENT:      Head: Normocephalic and atraumatic. Right Ear: Tympanic membrane normal.      Left Ear: Tympanic membrane normal.      Mouth/Throat:      Mouth: Mucous membranes are moist.      Pharynx: Oropharynx is clear. Eyes:      Pupils: Pupils are equal, round, and reactive to light. Cardiovascular:      Rate and Rhythm: Normal rate and regular rhythm. Pulses: Normal pulses. Heart sounds: Normal heart sounds. Pulmonary:      Effort: Pulmonary effort is normal.      Breath sounds: Normal breath sounds. Abdominal:      General: Bowel sounds are normal. There is no distension. Palpations: Abdomen is soft. Tenderness: There is abdominal tenderness (right groin). There is no right CVA tenderness or left CVA tenderness. Hernia: A hernia (right groin) is present. Musculoskeletal: Normal range of motion.    Skin:     General: Skin is warm and dry. Capillary Refill: Capillary refill takes less than 2 seconds. Neurological:      Mental Status: He is alert. Mental status is at baseline. 1. Essential hypertension      2. Benign prostatic hyperplasia with lower urinary tract symptoms, symptom details unspecified      3. Chronic neck and back pain      4. Right inguinal hernia      Patient chronic conditions are stable  Reviewed labs done 11/23/20 and are essentially normal.  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 Evaluated by Dr. Jacquelin Silva prior to his last orthopedic surgery in 2018 after  noted on EKG and told this was stable. No personal or family history of anesthesia reactions or problems  He is acceptable risk for surgery       Patient verbalizes understanding of plan of care as discussed above    Follow-up and Dispositions    · Return in about 6 months (around 5/24/2021) for or sooner for worsening symptoms.

## 2020-11-30 ENCOUNTER — HOSPITAL ENCOUNTER (OUTPATIENT)
Age: 80
Setting detail: OUTPATIENT SURGERY
Discharge: HOME | End: 2020-11-30
Attending: COLON & RECTAL SURGERY | Admitting: COLON & RECTAL SURGERY
Payer: MEDICARE

## 2020-11-30 ENCOUNTER — ANESTHESIA (OUTPATIENT)
Dept: SURGERY | Age: 80
End: 2020-11-30
Payer: MEDICARE

## 2020-11-30 VITALS
HEART RATE: 90 BPM | HEIGHT: 72 IN | DIASTOLIC BLOOD PRESSURE: 79 MMHG | WEIGHT: 152 LBS | OXYGEN SATURATION: 99 % | TEMPERATURE: 97.2 F | BODY MASS INDEX: 20.59 KG/M2 | RESPIRATION RATE: 20 BRPM | SYSTOLIC BLOOD PRESSURE: 155 MMHG

## 2020-11-30 DIAGNOSIS — K40.90 RIGHT INGUINAL HERNIA: Primary | ICD-10-CM

## 2020-11-30 PROCEDURE — 76060000033 HC ANESTHESIA 1 TO 1.5 HR: Performed by: COLON & RECTAL SURGERY

## 2020-11-30 PROCEDURE — 76010000149 HC OR TIME 1 TO 1.5 HR: Performed by: COLON & RECTAL SURGERY

## 2020-11-30 PROCEDURE — 76210000006 HC OR PH I REC 0.5 TO 1 HR: Performed by: COLON & RECTAL SURGERY

## 2020-11-30 PROCEDURE — 74011000250 HC RX REV CODE- 250

## 2020-11-30 PROCEDURE — 49505 PRP I/HERN INIT REDUC >5 YR: CPT | Performed by: COLON & RECTAL SURGERY

## 2020-11-30 PROCEDURE — 74011250636 HC RX REV CODE- 250/636

## 2020-11-30 PROCEDURE — 2709999900 HC NON-CHARGEABLE SUPPLY: Performed by: COLON & RECTAL SURGERY

## 2020-11-30 PROCEDURE — 77030002933 HC SUT MCRYL J&J -A: Performed by: COLON & RECTAL SURGERY

## 2020-11-30 PROCEDURE — 74011250636 HC RX REV CODE- 250/636: Performed by: NURSE ANESTHETIST, CERTIFIED REGISTERED

## 2020-11-30 PROCEDURE — 77030031139 HC SUT VCRL2 J&J -A: Performed by: COLON & RECTAL SURGERY

## 2020-11-30 PROCEDURE — 77030002966 HC SUT PDS J&J -A: Performed by: COLON & RECTAL SURGERY

## 2020-11-30 PROCEDURE — 74011000258 HC RX REV CODE- 258

## 2020-11-30 PROCEDURE — 77030010507 HC ADH SKN DERMBND J&J -B: Performed by: COLON & RECTAL SURGERY

## 2020-11-30 PROCEDURE — 74011250636 HC RX REV CODE- 250/636: Performed by: COLON & RECTAL SURGERY

## 2020-11-30 PROCEDURE — 74011000250 HC RX REV CODE- 250: Performed by: COLON & RECTAL SURGERY

## 2020-11-30 PROCEDURE — 74011000250 HC RX REV CODE- 250: Performed by: NURSE ANESTHETIST, CERTIFIED REGISTERED

## 2020-11-30 PROCEDURE — 77030040503 HC DRN WND PENRS MDII -A: Performed by: COLON & RECTAL SURGERY

## 2020-11-30 PROCEDURE — 76210000026 HC REC RM PH II 1 TO 1.5 HR: Performed by: COLON & RECTAL SURGERY

## 2020-11-30 PROCEDURE — C1781 MESH (IMPLANTABLE): HCPCS | Performed by: COLON & RECTAL SURGERY

## 2020-11-30 DEVICE — MESH HERN W3XL6IN INGUINAL POLYPR MFIL RECTANG: Type: IMPLANTABLE DEVICE | Site: INGUINAL | Status: FUNCTIONAL

## 2020-11-30 RX ORDER — EPHEDRINE SULFATE/0.9% NACL/PF 50 MG/5 ML
SYRINGE (ML) INTRAVENOUS AS NEEDED
Status: DISCONTINUED | OUTPATIENT
Start: 2020-11-30 | End: 2020-11-30 | Stop reason: HOSPADM

## 2020-11-30 RX ORDER — SODIUM CHLORIDE 9 MG/ML
25 INJECTION, SOLUTION INTRAVENOUS CONTINUOUS
Status: DISCONTINUED | OUTPATIENT
Start: 2020-11-30 | End: 2020-11-30 | Stop reason: HOSPADM

## 2020-11-30 RX ORDER — OXYCODONE AND ACETAMINOPHEN 5; 325 MG/1; MG/1
1 TABLET ORAL
Status: DISCONTINUED | OUTPATIENT
Start: 2020-11-30 | End: 2020-11-30 | Stop reason: HOSPADM

## 2020-11-30 RX ORDER — LIDOCAINE HYDROCHLORIDE 20 MG/ML
INJECTION, SOLUTION EPIDURAL; INFILTRATION; INTRACAUDAL; PERINEURAL AS NEEDED
Status: DISCONTINUED | OUTPATIENT
Start: 2020-11-30 | End: 2020-11-30 | Stop reason: HOSPADM

## 2020-11-30 RX ORDER — DEXAMETHASONE SODIUM PHOSPHATE 4 MG/ML
INJECTION, SOLUTION INTRA-ARTICULAR; INTRALESIONAL; INTRAMUSCULAR; INTRAVENOUS; SOFT TISSUE
Status: SHIPPED | OUTPATIENT
Start: 2020-11-30 | End: 2020-11-30

## 2020-11-30 RX ORDER — FENTANYL CITRATE 50 UG/ML
INJECTION, SOLUTION INTRAMUSCULAR; INTRAVENOUS AS NEEDED
Status: DISCONTINUED | OUTPATIENT
Start: 2020-11-30 | End: 2020-11-30 | Stop reason: HOSPADM

## 2020-11-30 RX ORDER — HYDROCODONE BITARTRATE AND ACETAMINOPHEN 5; 325 MG/1; MG/1
1 TABLET ORAL
Qty: 20 TAB | Refills: 0 | Status: SHIPPED | OUTPATIENT
Start: 2020-11-30 | End: 2020-12-07

## 2020-11-30 RX ORDER — SODIUM CHLORIDE 0.9 % (FLUSH) 0.9 %
5-40 SYRINGE (ML) INJECTION AS NEEDED
Status: DISCONTINUED | OUTPATIENT
Start: 2020-11-30 | End: 2020-11-30 | Stop reason: HOSPADM

## 2020-11-30 RX ORDER — GLYCOPYRROLATE 0.2 MG/ML
INJECTION INTRAMUSCULAR; INTRAVENOUS AS NEEDED
Status: DISCONTINUED | OUTPATIENT
Start: 2020-11-30 | End: 2020-11-30 | Stop reason: HOSPADM

## 2020-11-30 RX ORDER — BUPIVACAINE HYDROCHLORIDE 2.5 MG/ML
INJECTION, SOLUTION EPIDURAL; INFILTRATION; INTRACAUDAL
Status: SHIPPED | OUTPATIENT
Start: 2020-11-30 | End: 2020-11-30

## 2020-11-30 RX ORDER — ONDANSETRON 2 MG/ML
INJECTION INTRAMUSCULAR; INTRAVENOUS AS NEEDED
Status: DISCONTINUED | OUTPATIENT
Start: 2020-11-30 | End: 2020-11-30 | Stop reason: HOSPADM

## 2020-11-30 RX ORDER — BUPIVACAINE HYDROCHLORIDE AND EPINEPHRINE 5; 5 MG/ML; UG/ML
INJECTION, SOLUTION EPIDURAL; INTRACAUDAL; PERINEURAL
Status: DISCONTINUED
Start: 2020-11-30 | End: 2020-11-30 | Stop reason: HOSPADM

## 2020-11-30 RX ORDER — SODIUM CHLORIDE 0.9 % (FLUSH) 0.9 %
5-40 SYRINGE (ML) INJECTION EVERY 8 HOURS
Status: DISCONTINUED | OUTPATIENT
Start: 2020-11-30 | End: 2020-11-30 | Stop reason: HOSPADM

## 2020-11-30 RX ORDER — BUPIVACAINE HYDROCHLORIDE 5 MG/ML
INJECTION, SOLUTION EPIDURAL; INTRACAUDAL AS NEEDED
Status: DISCONTINUED | OUTPATIENT
Start: 2020-11-30 | End: 2020-11-30 | Stop reason: HOSPADM

## 2020-11-30 RX ORDER — PROPOFOL 10 MG/ML
INJECTION, EMULSION INTRAVENOUS AS NEEDED
Status: DISCONTINUED | OUTPATIENT
Start: 2020-11-30 | End: 2020-11-30 | Stop reason: HOSPADM

## 2020-11-30 RX ORDER — FENTANYL CITRATE 50 UG/ML
25 INJECTION, SOLUTION INTRAMUSCULAR; INTRAVENOUS
Status: DISCONTINUED | OUTPATIENT
Start: 2020-11-30 | End: 2020-11-30 | Stop reason: HOSPADM

## 2020-11-30 RX ADMIN — SODIUM CHLORIDE: 9 INJECTION, SOLUTION INTRAVENOUS at 10:51

## 2020-11-30 RX ADMIN — SODIUM CHLORIDE 25 ML/HR: 9 INJECTION, SOLUTION INTRAVENOUS at 09:13

## 2020-11-30 RX ADMIN — FENTANYL CITRATE 25 MCG: 50 INJECTION, SOLUTION INTRAMUSCULAR; INTRAVENOUS at 11:15

## 2020-11-30 RX ADMIN — Medication 10 MG: at 11:32

## 2020-11-30 RX ADMIN — FENTANYL CITRATE 25 MCG: 50 INJECTION, SOLUTION INTRAMUSCULAR; INTRAVENOUS at 11:24

## 2020-11-30 RX ADMIN — ONDANSETRON 4 MG: 2 INJECTION INTRAMUSCULAR; INTRAVENOUS at 11:45

## 2020-11-30 RX ADMIN — Medication 20 MG: at 11:16

## 2020-11-30 RX ADMIN — Medication 10 MG: at 11:40

## 2020-11-30 RX ADMIN — GLYCOPYRROLATE 0.2 MG: 0.2 INJECTION, SOLUTION INTRAMUSCULAR; INTRAVENOUS at 11:08

## 2020-11-30 RX ADMIN — BUPIVACAINE HYDROCHLORIDE 25 ML: 2.5 INJECTION, SOLUTION EPIDURAL; INFILTRATION; INTRACAUDAL at 11:06

## 2020-11-30 RX ADMIN — FENTANYL CITRATE 25 MCG: 50 INJECTION, SOLUTION INTRAMUSCULAR; INTRAVENOUS at 10:51

## 2020-11-30 RX ADMIN — DEXAMETHASONE SODIUM PHOSPHATE 5 MG: 4 INJECTION INTRA-ARTICULAR; INTRALESIONAL; INTRAMUSCULAR; INTRAVENOUS; SOFT TISSUE at 11:06

## 2020-11-30 RX ADMIN — Medication 10 MG: at 11:35

## 2020-11-30 RX ADMIN — FENTANYL CITRATE 25 MCG: 50 INJECTION, SOLUTION INTRAMUSCULAR; INTRAVENOUS at 11:00

## 2020-11-30 RX ADMIN — LIDOCAINE HYDROCHLORIDE 100 MG: 20 INJECTION, SOLUTION EPIDURAL; INFILTRATION; INTRACAUDAL at 10:55

## 2020-11-30 RX ADMIN — PROPOFOL 200 MG: 10 INJECTION, EMULSION INTRAVENOUS at 10:56

## 2020-11-30 NOTE — ANESTHESIA PROCEDURE NOTES
Peripheral Block    Start time: 11/30/2020 11:04 AM  End time: 11/30/2020 11:06 AM  Performed by: Mary Angel CRNA  Authorized by: Mary Angel CRNA       Pre-procedure:    Indications: at surgeon's request and post-op pain management    Preanesthetic Checklist: patient identified, risks and benefits discussed, site marked, timeout performed, anesthesia consent given and patient being monitored    Timeout Time: 11:03          Block Type:   Block Type:  TAP  Laterality:  Right  Monitoring:  Standard ASA monitoring, continuous pulse ox, frequent vital sign checks, heart rate and oxygen (Placed after induction.)  Injection Technique:  Single shot  Procedures: ultrasound guided    Patient Position: supine  Prep: betadine and povidone-iodine 7.5% surgical scrub    Location:  Abdominal  Needle Type:  Ultraplex  Needle Gauge:  22 G  Needle Localization:  Ultrasound guidance  Medication Injected:  Bupivacaine 0.25%, 25 mL  dexamethasone (DECADRON) 4 mg/mL injection, 5 mg  Med Admin Time: 11/30/2020 11:06 AM    Assessment:  Number of attempts:  1  Injection Assessment:  Incremental injection every 5 mL, no intravascular symptoms and negative aspiration for blood (Hydrodissection observed in TAP.)  Patient tolerance:  Patient tolerated the procedure well with no immediate complications

## 2020-11-30 NOTE — ANESTHESIA PREPROCEDURE EVALUATION
Relevant Problems   No relevant active problems       Anesthetic History   No history of anesthetic complications            Review of Systems / Medical History  Patient summary reviewed, nursing notes reviewed and pertinent labs reviewed    Pulmonary  Within defined limits                 Neuro/Psych   Within defined limits           Cardiovascular    Hypertension: well controlled              Exercise tolerance: >4 METS     GI/Hepatic/Renal  Within defined limits              Endo/Other  Within defined limits           Other Findings   Comments: BPH, Chronic neck/back pain. Physical Exam    Airway  Mallampati: III  TM Distance: > 6 cm  Neck ROM: decreased range of motion   Mouth opening: Normal     Cardiovascular    Rhythm: regular  Rate: normal         Dental    Dentition: Lower partial plate, Upper partial plate and Poor dentition  Comments: Extensive tooth decay apparent, poor hygiene.    Pulmonary  Breath sounds clear to auscultation               Abdominal  GI exam deferred       Other Findings            Anesthetic Plan    ASA: 2  Anesthesia type: general and regional - TAP block          Induction: Intravenous  Anesthetic plan and risks discussed with: Patient

## 2020-11-30 NOTE — OP NOTES
Operative Note    Patient: Manuel Schaefer  YOB: 1940  MRN: [id number]    Date of Procedure: 11/30/2020     Pre-Op Diagnosis: Right inguinal hernia    Post-Op Diagnosis: Same as preoperative diagnosis. Procedure(s): HERNIA INGUINAL REPAIR    Surgeon(s):  Tung Noyola MD    Surgical Assistant: Guzman    Anesthesia: General     Estimated Blood Loss (mL):  59Q    Complications: None    Specimens: * No specimens in log *     Implants:   Implant Name Type Inv. Item Serial No.  Lot No. LRB No. Used Action   bard mesh Mesh  [id number]  [id number] Right 1 Implanted       Drains: * No LDAs found *    Findings: Large direct inguinal hernia    Detailed Description of Procedure: The patient was brought to the operating room and positioned in the operating table in the supine position. Following the induction of general anesthesia the abdomen was prepped and draped in the usual sterile fashion. A surgical timeout was then performed at which time the patient's identity and surgical procedure were once again confirmed. After infiltrating 0.5 some Marcaine with epinephrine a skin incision was made overlying the inguinal canal.  This is taken through subcutaneous tissues electrocautery. There was a large bridging vein which was tied off and divided. The dissection continued to the aponeurosis of the external oblique. The aponeurosis of the external oblique was opened along the direction of its fibers using scalpel and Metzenbaum scissors. Medially it was open to beyond the external ring and laterally to beyond the internal ring. Hemostats were placed on the edges of the aponeurosis at this point cord structures were carefully dissected free off of the pubic tubercle and encircled with a Penrose drain. Remaining note the patient had a large direct inguinal hernia. No indirect component was identified.     Prolene mesh was then fashioned to recreate the floor the inguinal canal and the internal ring. This was secured in place with 2-0 PDS sutures. Was secured to the pubic tubercle medially, shelving edge of the inguinal ligament inferiorly, conjoined tendon superiorly. The internal ring was reconstructed just allow finger to pass. At this point after ensuring meticulous hemostasis cord structures were returned to anatomic position. The aponeurosis of the external oblique was closed with running 2-0 Vicryl suture. Justin's fascia was reapproximated interrupted 3-0 Vicryl sutures and 4-0 Monocryl stitch was used to close the skin in a running subcuticular fashion. Dermabond dressings were applied the procedure terminated. The patient awakened, extubated, taken to recovery in stable condition. All counts were correct at the close the case.     Electronically Signed by Marisela Leary MD on 11/30/2020 at 11:51 AM

## 2020-11-30 NOTE — ANESTHESIA POSTPROCEDURE EVALUATION
Procedure(s): HERNIA INGUINAL REPAIR. general, regional    Anesthesia Post Evaluation      Multimodal analgesia: multimodal analgesia used between 6 hours prior to anesthesia start to PACU discharge  Patient location during evaluation: PACU  Patient participation: complete - patient participated  Level of consciousness: awake  Pain score: 0  Pain management: adequate  Airway patency: patent  Anesthetic complications: no  Cardiovascular status: acceptable  Respiratory status: acceptable  Hydration status: acceptable  Post anesthesia nausea and vomiting:  controlled  Final Post Anesthesia Temperature Assessment:  Normothermia (36.0-37.5 degrees C)      INITIAL Post-op Vital signs: See nsg notes, VS WNL.

## 2020-11-30 NOTE — INTERVAL H&P NOTE
Update History & Physical 
 
The Patient's History and Physical of 11/12/2020 was reviewed with the patient and I examined the patient. There was no change. The surgical site was confirmed by the patient and me. Plan:  The risk, benefits, expected outcome, and alternative to the recommended procedure have been discussed with the patient. Patient understands and wants to proceed with the procedure.  
 
Electronically signed by Cassandra Bustos MD on 11/30/2020 at 10:24 AM

## 2020-12-17 ENCOUNTER — OFFICE VISIT (OUTPATIENT)
Dept: SURGERY | Age: 80
End: 2020-12-17
Payer: MEDICARE

## 2020-12-17 VITALS
SYSTOLIC BLOOD PRESSURE: 145 MMHG | TEMPERATURE: 97.5 F | OXYGEN SATURATION: 100 % | DIASTOLIC BLOOD PRESSURE: 74 MMHG | BODY MASS INDEX: 20.05 KG/M2 | HEIGHT: 72 IN | HEART RATE: 65 BPM | WEIGHT: 148 LBS

## 2020-12-17 DIAGNOSIS — K40.90 RIGHT INGUINAL HERNIA: Primary | ICD-10-CM

## 2020-12-17 PROCEDURE — 99024 POSTOP FOLLOW-UP VISIT: CPT | Performed by: COLON & RECTAL SURGERY

## 2020-12-17 NOTE — PROGRESS NOTES
OFFICE VISIT NOTE    Purnima Mcnamara is a [de-identified] y.o. male who presents to the office today for:    Chief Complaint   Patient presents with    Post OP Follow Up     Inguinal hernia repair 11/30/2020       Mr. Roman Kumar comes in today for follow-up status post repair of right inguinal hernia with mesh in November 30, 2020. He has no complaints regarding his surgical site. He denies any complications at incision. He denies any drainage from the incision. He states overall his pain continues to improve. He denies any testicular pain.       Past Medical History:   Diagnosis Date    Benign prostatic hyperplasia 6/5/2017    Essential hypertension 6/5/2017    Full thickness rotator cuff tear 6/5/2017    Full thickness rotator cuff tear 6/5/2017    Glaucoma of left eye 12/29/2017    Glaucoma of left eye 12/29/2017    Left inguinal hernia 6/5/2017    Left inguinal hernia 6/5/2017       Past Surgical History:   Procedure Laterality Date    HX CATARACT REMOVAL      HX CHOLECYSTECTOMY      HX HERNIA REPAIR  05/31/2016    Dr. Sandhya Euceda    [address]  11/30/2020    Inguinal-Dr. Sandhya Euceda    HX ORTHOPAEDIC Left     Shoulder    HX ORTHOPAEDIC Right     Shoulder    HX ORTHOPAEDIC Right 03/22/2018    Hammer toe surgery        Family History   Problem Relation Age of Onset    Heart Disease Mother     Cancer Brother        Social History     Socioeconomic History    Marital status:      Spouse name: Not on file    Number of children: Not on file    Years of education: Not on file    Highest education level: Not on file   Occupational History    Not on file   Social Needs    Financial resource strain: Not on file    Food insecurity     Worry: Not on file     Inability: Not on file    Transportation needs     Medical: Not on file     Non-medical: Not on file   Tobacco Use    Smoking status: Former Smoker     Packs/day: 0.50     Years: 10.00     Pack years: 5.00     Quit date:      Years since quittin.9    Smokeless tobacco: Never Used   Substance and Sexual Activity    Alcohol use: Never     Frequency: Never    Drug use: Never    Sexual activity: Not on file   Lifestyle    Physical activity     Days per week: Not on file     Minutes per session: Not on file    Stress: Not on file   Relationships    Social connections     Talks on phone: Not on file     Gets together: Not on file     Attends Zoroastrianism service: Not on file     Active member of club or organization: Not on file     Attends meetings of clubs or organizations: Not on file     Relationship status: Not on file    Intimate partner violence     Fear of current or ex partner: Not on file     Emotionally abused: Not on file     Physically abused: Not on file     Forced sexual activity: Not on file   Other Topics Concern    Not on file   Social History Narrative    Not on file       No Known Allergies    Current Outpatient Medications   Medication Sig    chlorhexidine (PERIDEX) 0.12 % solution     lisinopril-hydroCHLOROthiazide (PRINZIDE, ZESTORETIC) 10-12.5 mg per tablet Take 1 Tab by mouth daily.  Dutasteride-Tamsulosin 0.5-0.4 mg CM24 TAKE 1 CAPSULE BY MOUTH EVERY DAY    aspirin delayed-release 81 mg tablet Take 81 mg by mouth daily.  gabapentin (NEURONTIN) 100 mg capsule gabapentin 100 mg capsule     No current facility-administered medications for this visit. Review of Systems   All other systems reviewed and are negative. BP (!) 145/74 (BP 1 Location: Left arm, BP Patient Position: Sitting)   Pulse 65   Temp 97.5 °F (36.4 °C)   Ht 6' (1.829 m)   Wt 148 lb (67.1 kg)   SpO2 100%   BMI 20.07 kg/m²     Physical Exam  Abdominal:      Comments: On examination his right inguinal incision is healing nicely. There is a small healing ridge present.   There is no evidence of persistent or early recurrent hernia either at rest or on Valsalva. The right testicle is freely mobile in the scrotum. Problem List Items Addressed This Visit        Other    Right inguinal hernia - Primary          Assessment and Plan:    Mr. Maddie Tyson is doing well following his surgical procedure. I advised him to continue avoiding heavy lifting and strenuous activity for additional 4 weeks. I will see him back in 4 weeks for routine follow-up.           Alta Larson MD

## 2020-12-26 RX ORDER — LISINOPRIL AND HYDROCHLOROTHIAZIDE 10; 12.5 MG/1; MG/1
TABLET ORAL
Qty: 90 TAB | Refills: 0 | Status: SHIPPED | OUTPATIENT
Start: 2020-12-26 | End: 2021-03-23 | Stop reason: SDUPTHER

## 2020-12-31 DIAGNOSIS — N40.1 BENIGN PROSTATIC HYPERPLASIA WITH LOWER URINARY TRACT SYMPTOMS: ICD-10-CM

## 2020-12-31 RX ORDER — DUTASTERIDE AND TAMSULOSIN HYDROCHLORIDE CAPSULES .5; .4 MG/1; MG/1
CAPSULE ORAL
Qty: 90 CAP | Refills: 1 | Status: SHIPPED | OUTPATIENT
Start: 2020-12-31 | End: 2021-07-19

## 2021-01-14 ENCOUNTER — OFFICE VISIT (OUTPATIENT)
Dept: SURGERY | Age: 81
End: 2021-01-14
Payer: MEDICARE

## 2021-01-14 VITALS
SYSTOLIC BLOOD PRESSURE: 151 MMHG | HEIGHT: 72 IN | OXYGEN SATURATION: 98 % | BODY MASS INDEX: 20.59 KG/M2 | DIASTOLIC BLOOD PRESSURE: 52 MMHG | WEIGHT: 152 LBS | HEART RATE: 61 BPM | TEMPERATURE: 97.1 F

## 2021-01-14 DIAGNOSIS — K40.90 RIGHT INGUINAL HERNIA: Primary | ICD-10-CM

## 2021-01-14 PROCEDURE — 99024 POSTOP FOLLOW-UP VISIT: CPT | Performed by: COLON & RECTAL SURGERY

## 2021-01-14 NOTE — PROGRESS NOTES
OFFICE VISIT NOTE    Sumit Rodriguez is a [de-identified] y.o. male who presents to the office today for:    Chief Complaint   Patient presents with    Follow-up     Inguinal hernia repair 11/30/2020       Mr. Tessie Newman comes in today for follow-up status post repair of right inguinal hernia with mesh November 30, 2020. He has no complaints today. He denies any problems at his surgical site. He denies noticing recurrent hernia. Denies testicular pain on that side. Tolerating a diet and moving his bowels normally.       Past Medical History:   Diagnosis Date    Benign prostatic hyperplasia 6/5/2017    Essential hypertension 6/5/2017    Full thickness rotator cuff tear 6/5/2017    Full thickness rotator cuff tear 6/5/2017    Glaucoma of left eye 12/29/2017    Glaucoma of left eye 12/29/2017    Left inguinal hernia 6/5/2017    Left inguinal hernia 6/5/2017       Past Surgical History:   Procedure Laterality Date    HX CATARACT REMOVAL      HX CHOLECYSTECTOMY      HX HERNIA REPAIR  05/31/2016    Dr. Margi Schafer    [facility]  11/30/2020    Inguinal-Dr. Margi Schafer    HX ORTHOPAEDIC Left     Shoulder    HX ORTHOPAEDIC Right     Shoulder    HX ORTHOPAEDIC Right 03/22/2018    Hammer toe surgery        Family History   Problem Relation Age of Onset    Heart Disease Mother     Cancer Brother        Social History     Socioeconomic History    Marital status:      Spouse name: Not on file    Number of children: Not on file    Years of education: Not on file    Highest education level: Not on file   Occupational History    Not on file   Social Needs    Financial resource strain: Not on file    Food insecurity     Worry: Not on file     Inability: Not on file    Transportation needs     Medical: Not on file     Non-medical: Not on file   Tobacco Use    Smoking status: Former Smoker Packs/day: 0.50     Years: 10.00     Pack years: 5.00     Quit date: 36     Years since quittin.0    Smokeless tobacco: Never Used   Substance and Sexual Activity    Alcohol use: Never     Frequency: Never    Drug use: Never    Sexual activity: Not on file   Lifestyle    Physical activity     Days per week: Not on file     Minutes per session: Not on file    Stress: Not on file   Relationships    Social connections     Talks on phone: Not on file     Gets together: Not on file     Attends Yazidism service: Not on file     Active member of club or organization: Not on file     Attends meetings of clubs or organizations: Not on file     Relationship status: Not on file    Intimate partner violence     Fear of current or ex partner: Not on file     Emotionally abused: Not on file     Physically abused: Not on file     Forced sexual activity: Not on file   Other Topics Concern    Not on file   Social History Narrative    Not on file       No Known Allergies    Current Outpatient Medications   Medication Sig    Dutasteride-Tamsulosin 0.5-0.4 mg CM24 TAKE 1 CAPSULE BY MOUTH EVERY DAY  Indications: enlarged prostate with urination problem    lisinopril-hydroCHLOROthiazide (PRINZIDE, ZESTORETIC) 10-12.5 mg per tablet TAKE 1 TABLET BY MOUTH EVERY DAY    chlorhexidine (PERIDEX) 0.12 % solution     aspirin delayed-release 81 mg tablet Take 81 mg by mouth daily.  gabapentin (NEURONTIN) 100 mg capsule gabapentin 100 mg capsule     No current facility-administered medications for this visit. Review of Systems   All other systems reviewed and are negative. BP (!) 151/52 (BP 1 Location: Left arm, BP Patient Position: Sitting)   Pulse 61   Temp 97.1 °F (36.2 °C)   Ht 6' (1.829 m)   Wt 152 lb (68.9 kg)   SpO2 98%   BMI 20.61 kg/m²     Physical Exam  Abdominal:      Comments: On examination his right inguinal incision has healed nicely.   There is no evidence of recurrent or persistent hernia at rest or on Valsalva. His left inguinal incision from his prior surgery was also completely healed and there is no evidence of recurrent or persistent hernia on that side. Problem List Items Addressed This Visit        Other    Right inguinal hernia - Primary          Assessment and Plan:  I am pleased that Mr. Sabine Giron has done well following his surgery. I told him to slowly start increasing his activity level. He will follow-up with me on an as-needed basis. He did inquire as to when his wife will require repeat colonoscopy at home that she is due for colonoscopy in June 2021.             Merlin Bart, MD

## 2021-02-23 ENCOUNTER — OFFICE VISIT (OUTPATIENT)
Dept: PRIMARY CARE CLINIC | Age: 81
End: 2021-02-23
Payer: MEDICARE

## 2021-02-23 VITALS
BODY MASS INDEX: 21.16 KG/M2 | OXYGEN SATURATION: 98 % | HEART RATE: 81 BPM | WEIGHT: 156 LBS | SYSTOLIC BLOOD PRESSURE: 139 MMHG | TEMPERATURE: 97.7 F | RESPIRATION RATE: 18 BRPM | DIASTOLIC BLOOD PRESSURE: 70 MMHG

## 2021-02-23 DIAGNOSIS — M54.2 CHRONIC NECK AND BACK PAIN: ICD-10-CM

## 2021-02-23 DIAGNOSIS — K59.00 CONSTIPATION, UNSPECIFIED CONSTIPATION TYPE: Primary | ICD-10-CM

## 2021-02-23 DIAGNOSIS — M54.9 CHRONIC NECK AND BACK PAIN: ICD-10-CM

## 2021-02-23 DIAGNOSIS — K64.9 HEMORRHOIDS, UNSPECIFIED HEMORRHOID TYPE: ICD-10-CM

## 2021-02-23 DIAGNOSIS — I10 ESSENTIAL HYPERTENSION: ICD-10-CM

## 2021-02-23 DIAGNOSIS — G89.29 CHRONIC NECK AND BACK PAIN: ICD-10-CM

## 2021-02-23 DIAGNOSIS — N40.0 BENIGN PROSTATIC HYPERPLASIA WITHOUT LOWER URINARY TRACT SYMPTOMS: ICD-10-CM

## 2021-02-23 PROCEDURE — G8754 DIAS BP LESS 90: HCPCS | Performed by: NURSE PRACTITIONER

## 2021-02-23 PROCEDURE — G8432 DEP SCR NOT DOC, RNG: HCPCS | Performed by: NURSE PRACTITIONER

## 2021-02-23 PROCEDURE — G8752 SYS BP LESS 140: HCPCS | Performed by: NURSE PRACTITIONER

## 2021-02-23 PROCEDURE — G8427 DOCREV CUR MEDS BY ELIG CLIN: HCPCS | Performed by: NURSE PRACTITIONER

## 2021-02-23 PROCEDURE — 99214 OFFICE O/P EST MOD 30 MIN: CPT | Performed by: NURSE PRACTITIONER

## 2021-02-23 PROCEDURE — G8536 NO DOC ELDER MAL SCRN: HCPCS | Performed by: NURSE PRACTITIONER

## 2021-02-23 PROCEDURE — 1101F PT FALLS ASSESS-DOCD LE1/YR: CPT | Performed by: NURSE PRACTITIONER

## 2021-02-23 PROCEDURE — G8420 CALC BMI NORM PARAMETERS: HCPCS | Performed by: NURSE PRACTITIONER

## 2021-02-23 RX ORDER — POLYETHYLENE GLYCOL 3350 17 G/17G
17 POWDER, FOR SOLUTION ORAL DAILY
Qty: 30 PACKET | Refills: 2 | Status: SHIPPED | OUTPATIENT
Start: 2021-02-23 | End: 2021-08-19 | Stop reason: ALTCHOICE

## 2021-02-23 NOTE — PROGRESS NOTES
Bill Dooley is a [de-identified] y.o. male who presents to the office today for the following:    Chief Complaint   Patient presents with    Constipation     marilax helps but does not have a order     Hypertension    Pain (Chronic)       Past Medical History:   Diagnosis Date    Benign prostatic hyperplasia 2017    Essential hypertension 2017    Full thickness rotator cuff tear 2017    Full thickness rotator cuff tear 2017    Glaucoma of left eye 2017    Glaucoma of left eye 2017    Left inguinal hernia 2017    Left inguinal hernia 2017       Past Surgical History:   Procedure Laterality Date    HX CATARACT REMOVAL      HX CHOLECYSTECTOMY      HX HERNIA REPAIR  2016    Dr. Jt Boyle    [address]  2020    Inguinal-Dr. Jt Boyle    HX ORTHOPAEDIC Left     Shoulder    HX ORTHOPAEDIC Right     Shoulder    HX ORTHOPAEDIC Right 2018    Hammer toe surgery         Family History   Problem Relation Age of Onset    Heart Disease Mother     Cancer Brother         Social History     Tobacco Use    Smoking status: Former Smoker     Packs/day: 0.50     Years: 10.00     Pack years: 5.00     Quit date:      Years since quittin.1    Smokeless tobacco: Never Used   Substance Use Topics    Alcohol use: Never     Frequency: Never    Drug use: Never        HPI  Patient here with PMH of hypertension, BPH and arthritis who complains of intermittent constipation. States that last week, he became very constipated and was straining with having a BM. After having BM, noted a small amount of blood with wiping. Also was having some pain in the right groin where he had hernia repaired in 2020. States that since then, pain in groin is essentially gone and no further blood in stool. Does have h/o hemorrhoids which he thinks caused the blood. Was recommended to use miralax by his sister and used some of hers. Has had 2 BM's since taking and feels this is helping. Denies any nausea/vomiting, diarrhea, fever or changes in appetite. Current Outpatient Medications on File Prior to Visit   Medication Sig    Dutasteride-Tamsulosin 0.5-0.4 mg CM24 TAKE 1 CAPSULE BY MOUTH EVERY DAY  Indications: enlarged prostate with urination problem    lisinopril-hydroCHLOROthiazide (PRINZIDE, ZESTORETIC) 10-12.5 mg per tablet TAKE 1 TABLET BY MOUTH EVERY DAY    aspirin delayed-release 81 mg tablet Take 81 mg by mouth daily.  gabapentin (NEURONTIN) 100 mg capsule daily as needed. No current facility-administered medications on file prior to visit. Medications Ordered Today   Medications    polyethylene glycol (MIRALAX) 17 gram packet     Sig: Take 1 Packet by mouth daily. Dispense:  30 Packet     Refill:  2    hydrocortisone (ANUSOL-HC) 25 mg supp     Sig: Insert 1 Suppository into rectum every twelve (12) hours for 14 days. Dispense:  28 Suppository     Refill:  0        Review of Systems   Constitutional: Negative. Respiratory: Negative. Cardiovascular: Negative. Gastrointestinal: Positive for blood in stool and constipation. Negative for abdominal pain, diarrhea, heartburn, melena, nausea and vomiting. Genitourinary: Negative. Musculoskeletal: Positive for back pain, joint pain, myalgias and neck pain. Neurological: Negative. Visit Vitals  /70 (BP 1 Location: Left upper arm, BP Patient Position: Sitting, BP Cuff Size: Adult)   Pulse 81   Temp 97.7 °F (36.5 °C) (Tympanic)   Resp 18   Wt 156 lb (70.8 kg)   SpO2 98%   BMI 21.16 kg/m²       Physical Exam  Vitals signs and nursing note reviewed. Exam conducted with a chaperone present. Constitutional:       Appearance: Normal appearance. Cardiovascular:      Rate and Rhythm: Normal rate and regular rhythm. Pulses: Normal pulses. Heart sounds: Normal heart sounds. Pulmonary:      Effort: Pulmonary effort is normal.      Breath sounds: Normal breath sounds.    Abdominal: General: Bowel sounds are normal. There is no distension. Palpations: Abdomen is soft. There is no mass. Tenderness: There is no abdominal tenderness. There is no right CVA tenderness, left CVA tenderness, guarding or rebound. Hernia: No hernia is present. Genitourinary:     Rectum: Guaiac result negative. External hemorrhoid present. Musculoskeletal: Normal range of motion. Right lower leg: No edema. Left lower leg: No edema. Skin:     General: Skin is warm and dry. Neurological:      Mental Status: He is alert and oriented to person, place, and time. Mental status is at baseline. Psychiatric:         Mood and Affect: Mood normal.         Behavior: Behavior normal.              1. Constipation, unspecified constipation type  Start on miralax as directed  Encourage increased fiber, water and exercise    2. Hemorrhoids, unspecified hemorrhoid type  Start on cortisone suppositories as directed   Treatment and prevention of constipation  Hx colonscopy 2014 Dr. Catarino Adams and recommended repeat in 5 years  Declines eval with Dr. Catarino Adams at this time due to no further blood in stool and hemorrhoids are confirmed  Will consider if recurrence of blood or persistent problem    3. Essential hypertension  Blood pressure is controlled and continue medications as directed  Checking labs today  Encourage to monitor at home and notify provider if > 140/90 consistently  - CBC WITH AUTOMATED DIFF  - METABOLIC PANEL, COMPREHENSIVE  - URINALYSIS W/ RFLX MICROSCOPIC  - LIPID PANEL    4. Chronic neck and back pain  Symptoms stable and uses gabapentin occasionally for pain    5. Benign prostatic hyperplasia without lower urinary tract symptoms  Symptoms stable and on dutasteride-tamsulosin as directed      Patient verbalizes understanding of plan of care as discussed above    Follow-up and Dispositions    · Return in about 2 weeks (around 3/9/2021) for or sooner for worsening symptoms.

## 2021-02-24 ENCOUNTER — TELEPHONE (OUTPATIENT)
Dept: PRIMARY CARE CLINIC | Age: 81
End: 2021-02-24

## 2021-02-24 LAB
ALBUMIN SERPL-MCNC: 4.5 G/DL (ref 3.7–4.7)
ALBUMIN/GLOB SERPL: 2 {RATIO} (ref 1.2–2.2)
ALP SERPL-CCNC: 84 IU/L (ref 39–117)
ALT SERPL-CCNC: 16 IU/L (ref 0–44)
APPEARANCE UR: CLEAR
AST SERPL-CCNC: 23 IU/L (ref 0–40)
BASOPHILS # BLD AUTO: 0 X10E3/UL (ref 0–0.2)
BASOPHILS NFR BLD AUTO: 1 %
BILIRUB SERPL-MCNC: 0.3 MG/DL (ref 0–1.2)
BILIRUB UR QL STRIP: NEGATIVE
BUN SERPL-MCNC: 15 MG/DL (ref 8–27)
BUN/CREAT SERPL: 13 (ref 10–24)
CALCIUM SERPL-MCNC: 9.9 MG/DL (ref 8.6–10.2)
CHLORIDE SERPL-SCNC: 101 MMOL/L (ref 96–106)
CHOLEST SERPL-MCNC: 175 MG/DL (ref 100–199)
CO2 SERPL-SCNC: 27 MMOL/L (ref 20–29)
COLOR UR: YELLOW
CREAT SERPL-MCNC: 1.2 MG/DL (ref 0.76–1.27)
EOSINOPHIL # BLD AUTO: 0.1 X10E3/UL (ref 0–0.4)
EOSINOPHIL NFR BLD AUTO: 3 %
ERYTHROCYTE [DISTWIDTH] IN BLOOD BY AUTOMATED COUNT: 12.7 % (ref 11.6–15.4)
GLOBULIN SER CALC-MCNC: 2.2 G/DL (ref 1.5–4.5)
GLUCOSE SERPL-MCNC: 98 MG/DL (ref 65–99)
GLUCOSE UR QL: NEGATIVE
HCT VFR BLD AUTO: 42.3 % (ref 37.5–51)
HDLC SERPL-MCNC: 58 MG/DL
HGB BLD-MCNC: 14.4 G/DL (ref 13–17.7)
HGB UR QL STRIP: NEGATIVE
IMM GRANULOCYTES # BLD AUTO: 0 X10E3/UL (ref 0–0.1)
IMM GRANULOCYTES NFR BLD AUTO: 1 %
KETONES UR QL STRIP: NEGATIVE
LDLC SERPL CALC-MCNC: 98 MG/DL (ref 0–99)
LEUKOCYTE ESTERASE UR QL STRIP: NEGATIVE
LYMPHOCYTES # BLD AUTO: 0.8 X10E3/UL (ref 0.7–3.1)
LYMPHOCYTES NFR BLD AUTO: 19 %
MCH RBC QN AUTO: 31.8 PG (ref 26.6–33)
MCHC RBC AUTO-ENTMCNC: 34 G/DL (ref 31.5–35.7)
MCV RBC AUTO: 93 FL (ref 79–97)
MICRO URNS: ABNORMAL
MONOCYTES # BLD AUTO: 0.5 X10E3/UL (ref 0.1–0.9)
MONOCYTES NFR BLD AUTO: 11 %
NEUTROPHILS # BLD AUTO: 2.8 X10E3/UL (ref 1.4–7)
NEUTROPHILS NFR BLD AUTO: 65 %
NITRITE UR QL STRIP: NEGATIVE
PH UR STRIP: 8 [PH] (ref 5–7.5)
PLATELET # BLD AUTO: 194 X10E3/UL (ref 150–450)
POTASSIUM SERPL-SCNC: 4.3 MMOL/L (ref 3.5–5.2)
PROT SERPL-MCNC: 6.7 G/DL (ref 6–8.5)
PROT UR QL STRIP: NEGATIVE
RBC # BLD AUTO: 4.53 X10E6/UL (ref 4.14–5.8)
SODIUM SERPL-SCNC: 139 MMOL/L (ref 134–144)
SP GR UR: 1.01 (ref 1–1.03)
TRIGL SERPL-MCNC: 106 MG/DL (ref 0–149)
UROBILINOGEN UR STRIP-MCNC: 0.2 MG/DL (ref 0.2–1)
VLDLC SERPL CALC-MCNC: 19 MG/DL (ref 5–40)
WBC # BLD AUTO: 4.3 X10E3/UL (ref 3.4–10.8)

## 2021-02-24 RX ORDER — HYDROCORTISONE ACETATE 25 MG/1
25 SUPPOSITORY RECTAL EVERY 12 HOURS
Qty: 28 SUPPOSITORY | Refills: 0 | Status: SHIPPED | OUTPATIENT
Start: 2021-02-24 | End: 2021-03-10

## 2021-02-24 NOTE — TELEPHONE ENCOUNTER
called pt and was informed of the results      ----- Message from Tahir Cardenas NP sent at 2/24/2021 10:03 AM EST -----  Please let patient know that all labs are normal.

## 2021-03-23 DIAGNOSIS — I10 ESSENTIAL HYPERTENSION: Primary | ICD-10-CM

## 2021-03-23 DIAGNOSIS — I10 ESSENTIAL HYPERTENSION: ICD-10-CM

## 2021-03-23 RX ORDER — LISINOPRIL AND HYDROCHLOROTHIAZIDE 10; 12.5 MG/1; MG/1
TABLET ORAL
Qty: 90 TAB | Refills: 0 | Status: SHIPPED | OUTPATIENT
Start: 2021-03-23 | End: 2021-06-25

## 2021-03-23 RX ORDER — LISINOPRIL AND HYDROCHLOROTHIAZIDE 10; 12.5 MG/1; MG/1
TABLET ORAL
Qty: 90 TAB | Refills: 0 | Status: CANCELLED | OUTPATIENT
Start: 2021-03-23

## 2021-03-23 NOTE — TELEPHONE ENCOUNTER
Requested Prescriptions     Pending Prescriptions Disp Refills    lisinopril-hydroCHLOROthiazide (PRINZIDE, ZESTORETIC) 10-12.5 mg per tablet 90 Tab 0     Sig: TAKE 1 TABLET BY MOUTH EVERY DAY

## 2021-03-23 NOTE — TELEPHONE ENCOUNTER
Requested Prescriptions     Pending Prescriptions Disp Refills    lisinopril-hydroCHLOROthiazide (PRINZIDE, ZESTORETIC) 10-12.5 mg per tablet 90 Tab 0   90 day supply

## 2021-03-25 NOTE — TELEPHONE ENCOUNTER
Will have LORENA Bryan or LORENA Roberts schedule an Office or Virtual visit with patient per Dr. Vivi Ken.

## 2021-07-18 DIAGNOSIS — I10 ESSENTIAL HYPERTENSION: ICD-10-CM

## 2021-07-19 RX ORDER — LISINOPRIL AND HYDROCHLOROTHIAZIDE 10; 12.5 MG/1; MG/1
TABLET ORAL
Qty: 30 TABLET | Refills: 1 | Status: SHIPPED | OUTPATIENT
Start: 2021-07-19 | End: 2021-08-17 | Stop reason: SDUPTHER

## 2021-08-17 DIAGNOSIS — I10 ESSENTIAL HYPERTENSION: ICD-10-CM

## 2021-08-19 ENCOUNTER — OFFICE VISIT (OUTPATIENT)
Dept: PRIMARY CARE CLINIC | Age: 81
End: 2021-08-19
Payer: MEDICARE

## 2021-08-19 VITALS
DIASTOLIC BLOOD PRESSURE: 92 MMHG | WEIGHT: 144 LBS | TEMPERATURE: 97.2 F | RESPIRATION RATE: 18 BRPM | BODY MASS INDEX: 19.53 KG/M2 | HEART RATE: 69 BPM | SYSTOLIC BLOOD PRESSURE: 138 MMHG | OXYGEN SATURATION: 97 %

## 2021-08-19 DIAGNOSIS — G89.29 CHRONIC NECK AND BACK PAIN: ICD-10-CM

## 2021-08-19 DIAGNOSIS — M54.9 CHRONIC NECK AND BACK PAIN: ICD-10-CM

## 2021-08-19 DIAGNOSIS — K21.9 GASTROESOPHAGEAL REFLUX DISEASE WITHOUT ESOPHAGITIS: ICD-10-CM

## 2021-08-19 DIAGNOSIS — H40.9 GLAUCOMA, UNSPECIFIED GLAUCOMA TYPE, UNSPECIFIED LATERALITY: ICD-10-CM

## 2021-08-19 DIAGNOSIS — N40.1 BENIGN PROSTATIC HYPERPLASIA WITH LOWER URINARY TRACT SYMPTOMS, SYMPTOM DETAILS UNSPECIFIED: ICD-10-CM

## 2021-08-19 DIAGNOSIS — M54.2 CHRONIC NECK AND BACK PAIN: ICD-10-CM

## 2021-08-19 DIAGNOSIS — K59.00 CONSTIPATION, UNSPECIFIED CONSTIPATION TYPE: ICD-10-CM

## 2021-08-19 DIAGNOSIS — I10 ESSENTIAL HYPERTENSION: Primary | ICD-10-CM

## 2021-08-19 PROCEDURE — G8536 NO DOC ELDER MAL SCRN: HCPCS | Performed by: NURSE PRACTITIONER

## 2021-08-19 PROCEDURE — G8427 DOCREV CUR MEDS BY ELIG CLIN: HCPCS | Performed by: NURSE PRACTITIONER

## 2021-08-19 PROCEDURE — G8432 DEP SCR NOT DOC, RNG: HCPCS | Performed by: NURSE PRACTITIONER

## 2021-08-19 PROCEDURE — G8754 DIAS BP LESS 90: HCPCS | Performed by: NURSE PRACTITIONER

## 2021-08-19 PROCEDURE — 1101F PT FALLS ASSESS-DOCD LE1/YR: CPT | Performed by: NURSE PRACTITIONER

## 2021-08-19 PROCEDURE — G8752 SYS BP LESS 140: HCPCS | Performed by: NURSE PRACTITIONER

## 2021-08-19 PROCEDURE — G8420 CALC BMI NORM PARAMETERS: HCPCS | Performed by: NURSE PRACTITIONER

## 2021-08-19 PROCEDURE — 99214 OFFICE O/P EST MOD 30 MIN: CPT | Performed by: NURSE PRACTITIONER

## 2021-08-19 RX ORDER — LISINOPRIL AND HYDROCHLOROTHIAZIDE 10; 12.5 MG/1; MG/1
1 TABLET ORAL DAILY
Qty: 90 TABLET | Refills: 1 | Status: SHIPPED | OUTPATIENT
Start: 2021-08-19 | End: 2022-03-01 | Stop reason: SDUPTHER

## 2021-08-19 RX ORDER — DICLOFENAC SODIUM 10 MG/G
GEL TOPICAL
COMMUNITY
Start: 2021-06-08 | End: 2022-03-24 | Stop reason: ALTCHOICE

## 2021-08-19 RX ORDER — PHENOL/SODIUM PHENOLATE
20 AEROSOL, SPRAY (ML) MUCOUS MEMBRANE DAILY
Qty: 30 TABLET | Refills: 0 | Status: SHIPPED | OUTPATIENT
Start: 2021-08-19 | End: 2021-09-10

## 2021-08-19 NOTE — PROGRESS NOTES
Nemo Sexton is a 80 y.o. male who presents to the office today for the following:    Chief Complaint   Patient presents with    Hypertension       Past Medical History:   Diagnosis Date    Benign prostatic hyperplasia 2017    Essential hypertension 2017    Full thickness rotator cuff tear 2017    Full thickness rotator cuff tear 2017    Glaucoma of left eye 2017    Glaucoma of left eye 2017    Left inguinal hernia 2017    Left inguinal hernia 2017       Past Surgical History:   Procedure Laterality Date    HX CATARACT REMOVAL      HX CHOLECYSTECTOMY      HX HERNIA REPAIR  2016    Dr. Charity Ley    [address]  2020    Inguinal-Dr. Charity Ley    HX ORTHOPAEDIC Left     Shoulder    HX ORTHOPAEDIC Right     Shoulder    HX ORTHOPAEDIC Right 2018    Hammer toe surgery         Family History   Problem Relation Age of Onset    Heart Disease Mother     Cancer Brother         Social History     Tobacco Use    Smoking status: Former Smoker     Packs/day: 0.50     Years: 10.00     Pack years: 5.00     Quit date:      Years since quittin.6    Smokeless tobacco: Never Used   Substance Use Topics    Alcohol use: Never    Drug use: Never        HPI  Patient here today for follow up with PMH of hypertension, chronic neck pain, BPH, glaucoma, GERD and constipation. Reports he has been taking medications as directed. Has recently had more frequent reflux which occurs mainly after dinner. Has had problems in the past and was on medication. Tries to watch what he eats but recently it does not matter. Taking some \"baking soda\" and this helps relieve. Denies any nausea, vomiting or other associated symptoms. Current Outpatient Medications on File Prior to Visit   Medication Sig    lisinopril-hydroCHLOROthiazide (PRINZIDE, ZESTORETIC) 10-12.5 mg per tablet Take 1 Tablet by mouth daily.     diclofenac (VOLTAREN) 1 % gel     Dutasteride-Tamsulosin 0.5-0.4 mg CM24 TAKE 1 CAPSULE BY MOUTH EVERY DAY INDICATIONS: ENLARGED PROSTATE WITH URINATION PROBLEM    aspirin delayed-release 81 mg tablet Take 81 mg by mouth daily.  gabapentin (NEURONTIN) 100 mg capsule daily as needed.  [DISCONTINUED] polyethylene glycol (MIRALAX) 17 gram packet Take 1 Packet by mouth daily. No current facility-administered medications on file prior to visit. Medications Ordered Today   Medications    Omeprazole delayed release (PRILOSEC D/R) 20 mg tablet     Sig: Take 1 Tablet by mouth daily. Dispense:  30 Tablet     Refill:  0        Review of Systems   Constitutional: Negative for chills, diaphoresis, fever, malaise/fatigue and weight loss. HENT: Negative. Eyes: Negative. Respiratory: Negative. Cardiovascular: Negative. Gastrointestinal: Positive for constipation (occasional) and heartburn. Negative for abdominal pain, blood in stool, diarrhea, nausea and vomiting. Genitourinary: Negative. Musculoskeletal: Positive for myalgias and neck pain. Skin: Negative. Neurological: Negative. Psychiatric/Behavioral: Negative. Visit Vitals  BP (!) 138/92   Pulse 69   Temp 97.2 °F (36.2 °C) (Temporal)   Resp 18   Wt 144 lb (65.3 kg)   SpO2 97%   BMI 19.53 kg/m²       Physical Exam  Vitals and nursing note reviewed. Constitutional:       Appearance: Normal appearance. Cardiovascular:      Rate and Rhythm: Normal rate and regular rhythm. Pulses: Normal pulses. Heart sounds: Normal heart sounds. Pulmonary:      Effort: Pulmonary effort is normal.      Breath sounds: Normal breath sounds. Abdominal:      General: Bowel sounds are normal.      Palpations: Abdomen is soft. Tenderness: There is no abdominal tenderness. There is no guarding or rebound. Hernia: No hernia is present. Musculoskeletal:         General: Normal range of motion. Right lower leg: No edema. Left lower leg: No edema.    Skin:     General: Skin is warm and dry. Neurological:      Mental Status: He is alert and oriented to person, place, and time. Mental status is at baseline. 1. Essential hypertension  Blood pressure is essentially stable and continue medication as directed    2. Benign prostatic hyperplasia with lower urinary tract symptoms, symptom details unspecified  Stable and continues on dutasteride-tamsulosin as directed    3. Chronic neck and back pain  Stable and uses gabapentin prn     4. Glaucoma, unspecified glaucoma type, unspecified laterality  Stable and follows with opthalmology    5. Constipation, unspecified constipation type  Stable and uses miralax 1-2 times weekly     6. Gastroesophageal reflux disease without esophagitis  Recent increased symptoms over past 2 weeks   Going to start on prilosec 20mg daily as directed   He does not use  NSAIDS and advised to avoid  Avoid foods that worsen symptoms, elevate HOB prn, eat smaller, more freuquent meals, avoid laying flat 1 hour after meal  If not improving over next 1-2 weeks, will consider referral to GI for further eval and treatment  - Omeprazole delayed release (PRILOSEC D/R) 20 mg tablet; Take 1 Tablet by mouth daily. Dispense: 30 Tablet; Refill: 0      Patient verbalizes understanding of plan of care as discussed above    Follow-up and Dispositions    · Return in about 2 weeks (around 9/2/2021) for or sooner for worsening symptoms.

## 2021-09-10 DIAGNOSIS — K21.9 GASTROESOPHAGEAL REFLUX DISEASE WITHOUT ESOPHAGITIS: ICD-10-CM

## 2021-09-10 RX ORDER — OMEPRAZOLE 20 MG/1
CAPSULE, DELAYED RELEASE ORAL
Qty: 30 CAPSULE | Refills: 0 | Status: SHIPPED | OUTPATIENT
Start: 2021-09-10 | End: 2021-10-05 | Stop reason: SDUPTHER

## 2021-09-20 DIAGNOSIS — N40.1 BENIGN PROSTATIC HYPERPLASIA WITH LOWER URINARY TRACT SYMPTOMS: ICD-10-CM

## 2021-09-20 NOTE — TELEPHONE ENCOUNTER
Requested Prescriptions     Pending Prescriptions Disp Refills    Dutasteride-Tamsulosin 0.5-0.4 mg CM24 90 Capsule 0     Sig: Indications: enlarged prostate with urination problem

## 2021-09-25 RX ORDER — DUTASTERIDE AND TAMSULOSIN HYDROCHLORIDE CAPSULES .5; .4 MG/1; MG/1
CAPSULE ORAL
Qty: 90 CAPSULE | Refills: 0 | Status: SHIPPED | OUTPATIENT
Start: 2021-09-25 | End: 2022-01-12 | Stop reason: SDUPTHER

## 2021-10-05 DIAGNOSIS — K21.9 GASTROESOPHAGEAL REFLUX DISEASE WITHOUT ESOPHAGITIS: ICD-10-CM

## 2021-10-05 RX ORDER — OMEPRAZOLE 20 MG/1
20 CAPSULE, DELAYED RELEASE ORAL DAILY
Qty: 90 CAPSULE | Refills: 0 | Status: SHIPPED | OUTPATIENT
Start: 2021-10-05 | End: 2022-01-12

## 2021-11-22 ENCOUNTER — TELEPHONE (OUTPATIENT)
Dept: PRIMARY CARE CLINIC | Age: 81
End: 2021-11-22

## 2021-11-22 NOTE — TELEPHONE ENCOUNTER
Patient wanted to let  You know that the medication that you prescribed polyethylene glycol has really worked. He is very much improved, appetite good, etc.  Overall, very much satisfied. He was raving about you.

## 2022-01-12 DIAGNOSIS — N40.1 BENIGN PROSTATIC HYPERPLASIA WITH LOWER URINARY TRACT SYMPTOMS: ICD-10-CM

## 2022-01-12 DIAGNOSIS — K21.9 GASTROESOPHAGEAL REFLUX DISEASE WITHOUT ESOPHAGITIS: ICD-10-CM

## 2022-01-12 RX ORDER — OMEPRAZOLE 20 MG/1
CAPSULE, DELAYED RELEASE ORAL
Qty: 90 CAPSULE | Refills: 0 | Status: SHIPPED | OUTPATIENT
Start: 2022-01-12 | End: 2022-04-08

## 2022-01-12 RX ORDER — DUTASTERIDE AND TAMSULOSIN HYDROCHLORIDE CAPSULES .5; .4 MG/1; MG/1
CAPSULE ORAL
Qty: 90 CAPSULE | Refills: 0 | Status: SHIPPED | OUTPATIENT
Start: 2022-01-12 | End: 2022-04-08

## 2022-01-24 ENCOUNTER — NURSE TRIAGE (OUTPATIENT)
Dept: OTHER | Facility: CLINIC | Age: 82
End: 2022-01-24

## 2022-01-24 NOTE — TELEPHONE ENCOUNTER
Received call from [address] at [facility] with Red Flag Complaint. Subjective: Caller states had episode of  discomfort yesterday in chest,lasted 3-4 minutes,felt like \"something pressing on my chest\",right side of chest going up to throat,was hard to catch breath. Current Symptoms:Denies    Onset: yesterday  afternoon     Associated Symptoms: NA    Pain Severity: Denies    Temperature: Denies    What has been tried: Nothing    LMP: NA Pregnant: NA    Recommended disposition: See provider in office today. UCC/ER if no appt. available    Care advice provided, patient verbalizes understanding; denies any other questions or concerns; instructed to call back for any new or worsening symptoms. Writer provided warm transfer to  at [facility] for appointment scheduling    Attention Provider: Thank you for allowing me to participate in the care of your patient. The patient was connected to triage in response to information provided to the E[facility]. Please do not respond through this encounter as the response is not directed to a shared pool.     Reason for Disposition   All other patients with chest pain (Exception: fleeting chest pain lasting a few seconds)    Protocols used: CHEST PAIN-ADULT-[place]

## 2022-01-25 ENCOUNTER — OFFICE VISIT (OUTPATIENT)
Dept: PRIMARY CARE CLINIC | Age: 82
End: 2022-01-25
Payer: MEDICARE

## 2022-01-25 VITALS
HEART RATE: 75 BPM | TEMPERATURE: 96.8 F | HEIGHT: 72 IN | RESPIRATION RATE: 18 BRPM | SYSTOLIC BLOOD PRESSURE: 139 MMHG | DIASTOLIC BLOOD PRESSURE: 74 MMHG | WEIGHT: 156.8 LBS | BODY MASS INDEX: 21.24 KG/M2 | OXYGEN SATURATION: 95 %

## 2022-01-25 DIAGNOSIS — M54.2 CHRONIC NECK AND BACK PAIN: ICD-10-CM

## 2022-01-25 DIAGNOSIS — R53.83 FATIGUE, UNSPECIFIED TYPE: Primary | ICD-10-CM

## 2022-01-25 DIAGNOSIS — N40.1 BENIGN PROSTATIC HYPERPLASIA WITH LOWER URINARY TRACT SYMPTOMS, SYMPTOM DETAILS UNSPECIFIED: ICD-10-CM

## 2022-01-25 DIAGNOSIS — R07.89 PRESSURE IN CHEST: ICD-10-CM

## 2022-01-25 DIAGNOSIS — G89.29 CHRONIC NECK AND BACK PAIN: ICD-10-CM

## 2022-01-25 DIAGNOSIS — I10 ESSENTIAL HYPERTENSION: ICD-10-CM

## 2022-01-25 DIAGNOSIS — M54.9 CHRONIC NECK AND BACK PAIN: ICD-10-CM

## 2022-01-25 DIAGNOSIS — K21.9 GASTROESOPHAGEAL REFLUX DISEASE WITHOUT ESOPHAGITIS: ICD-10-CM

## 2022-01-25 DIAGNOSIS — H40.9 GLAUCOMA, UNSPECIFIED GLAUCOMA TYPE, UNSPECIFIED LATERALITY: ICD-10-CM

## 2022-01-25 PROCEDURE — G8420 CALC BMI NORM PARAMETERS: HCPCS | Performed by: NURSE PRACTITIONER

## 2022-01-25 PROCEDURE — 1101F PT FALLS ASSESS-DOCD LE1/YR: CPT | Performed by: NURSE PRACTITIONER

## 2022-01-25 PROCEDURE — G8432 DEP SCR NOT DOC, RNG: HCPCS | Performed by: NURSE PRACTITIONER

## 2022-01-25 PROCEDURE — G8427 DOCREV CUR MEDS BY ELIG CLIN: HCPCS | Performed by: NURSE PRACTITIONER

## 2022-01-25 PROCEDURE — 99214 OFFICE O/P EST MOD 30 MIN: CPT | Performed by: NURSE PRACTITIONER

## 2022-01-25 PROCEDURE — 93000 ELECTROCARDIOGRAM COMPLETE: CPT | Performed by: NURSE PRACTITIONER

## 2022-01-25 PROCEDURE — G8752 SYS BP LESS 140: HCPCS | Performed by: NURSE PRACTITIONER

## 2022-01-25 PROCEDURE — G8754 DIAS BP LESS 90: HCPCS | Performed by: NURSE PRACTITIONER

## 2022-01-25 PROCEDURE — G8536 NO DOC ELDER MAL SCRN: HCPCS | Performed by: NURSE PRACTITIONER

## 2022-01-25 NOTE — PROGRESS NOTES
Chief Complaint   Patient presents with    Chest injury    Breathing Problem     Pt stated that he was outside sweeping snow off the stairs and the cold air got to him. He states its not really chest pain just discomfort feels like something in his lungs, (like a weight on his chest and feeling weak. 1. Have you been to the ER, urgent care clinic since your last visit? Hospitalized since your last visit? No    2. Have you seen or consulted any other health care providers outside of the [address] [place] [address] since your last visit? Include any pap smears or colon screening.  No

## 2022-01-25 NOTE — PROGRESS NOTES
Lizeth Chau is a 80 y.o. male who presents to the office today for the following:    Chief Complaint   Patient presents with    Chest Pain    Breathing Problem       Past Medical History:   Diagnosis Date    Benign prostatic hyperplasia 2017    Essential hypertension 2017    Full thickness rotator cuff tear 2017    Full thickness rotator cuff tear 2017    Glaucoma of left eye 2017    Glaucoma of left eye 2017    Left inguinal hernia 2017    Left inguinal hernia 2017       Past Surgical History:   Procedure Laterality Date    HX CATARACT REMOVAL      HX CHOLECYSTECTOMY      HX HERNIA REPAIR  2016    Dr. Mitra Mora    [address]  2020    Inguinal-Dr. Mitra Mora    HX ORTHOPAEDIC Left     Shoulder    HX ORTHOPAEDIC Right     Shoulder    HX ORTHOPAEDIC Right 2018    Hammer toe surgery         Family History   Problem Relation Age of Onset    Heart Disease Mother     Cancer Brother         Social History     Tobacco Use    Smoking status: Former Smoker     Packs/day: 0.50     Years: 10.00     Pack years: 5.00     Quit date:      Years since quittin.1    Smokeless tobacco: Never Used   Substance Use Topics    Alcohol use: Never    Drug use: Never        HPI  Patient here today for follow up  due to chest pain. Has PMH of hypertension, GERD, BPH, neuropathy and arthritis. States that a few days ago he experienced \"pressure\" in chest that lasted for several minutes. Has not had any reoccurrence since then but does feel more fatigued and out of breath than normal. No recent heart problems but did see a specialist about something with his \"rhythm\" in past. Denies any fever, cough or other symptoms. Current Outpatient Medications on File Prior to Visit   Medication Sig    Dutasteride-Tamsulosin 0.5-0.4 mg CM24 TAKE 1 CAPSULE BY MOUTH EVERY DAY.   Indications: enlarged prostate with urination problem    omeprazole (PRILOSEC) 20 mg capsule TAKE 1 CAPSULE BY MOUTH EVERY DAY    lisinopril-hydroCHLOROthiazide (PRINZIDE, ZESTORETIC) 10-12.5 mg per tablet Take 1 Tablet by mouth daily.  aspirin delayed-release 81 mg tablet Take 81 mg by mouth daily.  gabapentin (NEURONTIN) 100 mg capsule daily as needed.  diclofenac (VOLTAREN) 1 % gel  (Patient not taking: Reported on 1/25/2022)     No current facility-administered medications on file prior to visit. No orders of the defined types were placed in this encounter. Review of Systems   Constitutional: Negative. HENT: Negative. Respiratory: Negative for cough, hemoptysis, sputum production and wheezing. Cardiovascular: Positive for chest pain. Negative for palpitations, claudication and leg swelling. Gastrointestinal: Negative. Genitourinary: Negative. Musculoskeletal: Positive for myalgias. Neurological: Positive for tingling. Negative for dizziness, tremors, speech change, focal weakness, loss of consciousness, weakness and headaches. Visit Vitals  /74 (BP 1 Location: Left upper arm, BP Patient Position: Sitting, BP Cuff Size: Adult)   Pulse 75   Temp 96.8 °F (36 °C) (Temporal)   Resp 18   Ht 6' (1.829 m)   Wt 156 lb 12.8 oz (71.1 kg)   SpO2 95%   BMI 21.27 kg/m²       Physical Exam  Vitals and nursing note reviewed. Constitutional:       Appearance: Normal appearance. Neck:      Vascular: No carotid bruit. Cardiovascular:      Rate and Rhythm: Normal rate. Rhythm irregular. Pulses: Normal pulses. Heart sounds: Normal heart sounds. Pulmonary:      Effort: Pulmonary effort is normal.      Breath sounds: Normal breath sounds. Abdominal:      General: Bowel sounds are normal.      Palpations: Abdomen is soft. Tenderness: There is no abdominal tenderness. Musculoskeletal:         General: Normal range of motion. Right lower leg: No edema. Left lower leg: No edema. Skin:     General: Skin is warm and dry. Neurological:      Mental Status: He is alert and oriented to person, place, and time. Mental status is at baseline. 1. Fatigue, unspecified type  Checking labs to evaluate for any changes  - CBC WITH AUTOMATED DIFF  - METABOLIC PANEL, COMPREHENSIVE  - URINALYSIS W/ RFLX MICROSCOPIC  - LIPID PANEL  - TSH RFX ON ABNORMAL TO FREE T4    2. 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 Compared to EKG done 11/23/20 and do not see any acute changes. Given no current symptoms, going to refer to cardiology to evaluate and treat  Advised if develops persistent chest pain or shortness of breath, seek emergency care immediately as unknown if life threatening.  - REFERRAL TO CARDIOLOGY  - AMB POC EKG ROUTINE W/ 12 LEADS, INTER & REP    3. Gastroesophageal reflux disease without esophagitis  Stable and on prilosec 20mg  as directed    4. Benign prostatic hyperplasia with lower urinary tract symptoms, symptom details unspecified  Lab Results   Component Value Date/Time    Prostate Specific Ag 1.2 08/11/2020 10:26 AM     Stable and continues medicine as directed    5. Glaucoma, unspecified glaucoma type, unspecified laterality  Follows with ophthalmology     6. Essential hypertension  Blood pressure is controlled and continue medicine as directed    7. Chronic neck and back pain  Stable and uses tylenol prn      Patient verbalizes understanding of plan of care as discussed above    Follow-up and Dispositions    · Return in about 6 weeks (around 3/8/2022) for or sooner for worsening symptoms.

## 2022-01-28 LAB
ALBUMIN SERPL-MCNC: 4 G/DL (ref 3.6–4.6)
ALBUMIN/GLOB SERPL: 1.3 {RATIO} (ref 1.2–2.2)
ALP SERPL-CCNC: 82 IU/L (ref 44–121)
ALT SERPL-CCNC: 10 IU/L (ref 0–44)
APPEARANCE UR: CLEAR
AST SERPL-CCNC: 22 IU/L (ref 0–40)
BASOPHILS # BLD AUTO: 0 X10E3/UL (ref 0–0.2)
BASOPHILS NFR BLD AUTO: 1 %
BILIRUB SERPL-MCNC: 0.4 MG/DL (ref 0–1.2)
BILIRUB UR QL STRIP: NEGATIVE
BUN SERPL-MCNC: 16 MG/DL (ref 8–27)
BUN/CREAT SERPL: 14 (ref 10–24)
CALCIUM SERPL-MCNC: 9.7 MG/DL (ref 8.6–10.2)
CHLORIDE SERPL-SCNC: 103 MMOL/L (ref 96–106)
CHOLEST SERPL-MCNC: 151 MG/DL (ref 100–199)
CO2 SERPL-SCNC: 28 MMOL/L (ref 20–29)
COLOR UR: YELLOW
CREAT SERPL-MCNC: 1.13 MG/DL (ref 0.76–1.27)
EOSINOPHIL # BLD AUTO: 0.2 X10E3/UL (ref 0–0.4)
EOSINOPHIL NFR BLD AUTO: 5 %
ERYTHROCYTE [DISTWIDTH] IN BLOOD BY AUTOMATED COUNT: 12.2 % (ref 11.6–15.4)
GLOBULIN SER CALC-MCNC: 3.1 G/DL (ref 1.5–4.5)
GLUCOSE SERPL-MCNC: 95 MG/DL (ref 65–99)
GLUCOSE UR QL STRIP: NEGATIVE
HCT VFR BLD AUTO: 41.4 % (ref 37.5–51)
HDLC SERPL-MCNC: 54 MG/DL
HGB BLD-MCNC: 14.2 G/DL (ref 13–17.7)
HGB UR QL STRIP: NEGATIVE
IMM GRANULOCYTES # BLD AUTO: 0 X10E3/UL (ref 0–0.1)
IMM GRANULOCYTES NFR BLD AUTO: 0 %
KETONES UR QL STRIP: NEGATIVE
LDLC SERPL CALC-MCNC: 81 MG/DL (ref 0–99)
LEUKOCYTE ESTERASE UR QL STRIP: NEGATIVE
LYMPHOCYTES # BLD AUTO: 1 X10E3/UL (ref 0.7–3.1)
LYMPHOCYTES NFR BLD AUTO: 25 %
MCH RBC QN AUTO: 31.8 PG (ref 26.6–33)
MCHC RBC AUTO-ENTMCNC: 34.3 G/DL (ref 31.5–35.7)
MCV RBC AUTO: 93 FL (ref 79–97)
MICRO URNS: NORMAL
MONOCYTES # BLD AUTO: 0.5 X10E3/UL (ref 0.1–0.9)
MONOCYTES NFR BLD AUTO: 13 %
NEUTROPHILS # BLD AUTO: 2.3 X10E3/UL (ref 1.4–7)
NEUTROPHILS NFR BLD AUTO: 56 %
NITRITE UR QL STRIP: NEGATIVE
PH UR STRIP: 6 [PH] (ref 5–7.5)
PLATELET # BLD AUTO: 183 X10E3/UL (ref 150–450)
POTASSIUM SERPL-SCNC: 4 MMOL/L (ref 3.5–5.2)
PROT SERPL-MCNC: 7.1 G/DL (ref 6–8.5)
PROT UR QL STRIP: NEGATIVE
RBC # BLD AUTO: 4.47 X10E6/UL (ref 4.14–5.8)
SODIUM SERPL-SCNC: 143 MMOL/L (ref 134–144)
SP GR UR STRIP: 1.03 (ref 1–1.03)
TRIGL SERPL-MCNC: 83 MG/DL (ref 0–149)
TSH SERPL DL<=0.005 MIU/L-ACNC: 2.66 UIU/ML (ref 0.45–4.5)
UROBILINOGEN UR STRIP-MCNC: 0.2 MG/DL (ref 0.2–1)
VLDLC SERPL CALC-MCNC: 16 MG/DL (ref 5–40)
WBC # BLD AUTO: 4 X10E3/UL (ref 3.4–10.8)

## 2022-01-28 NOTE — PROGRESS NOTES
Informed patient of lab results looking great. Patient did not have any questions or concerns at this time.

## 2022-02-09 ENCOUNTER — APPOINTMENT (OUTPATIENT)
Dept: GENERAL RADIOLOGY | Age: 82
End: 2022-02-09
Attending: EMERGENCY MEDICINE
Payer: MEDICARE

## 2022-02-09 ENCOUNTER — HOSPITAL ENCOUNTER (EMERGENCY)
Age: 82
Discharge: HOME | End: 2022-02-09
Attending: EMERGENCY MEDICINE
Payer: MEDICARE

## 2022-02-09 VITALS
OXYGEN SATURATION: 100 % | SYSTOLIC BLOOD PRESSURE: 154 MMHG | HEART RATE: 62 BPM | BODY MASS INDEX: 21.26 KG/M2 | WEIGHT: 157 LBS | RESPIRATION RATE: 20 BRPM | DIASTOLIC BLOOD PRESSURE: 85 MMHG | TEMPERATURE: 98 F | HEIGHT: 72 IN

## 2022-02-09 DIAGNOSIS — R07.9 ACUTE CHEST PAIN: Primary | ICD-10-CM

## 2022-02-09 LAB
ALBUMIN SERPL-MCNC: 3.7 G/DL (ref 3.5–5)
ALBUMIN/GLOB SERPL: 1.1 {RATIO} (ref 1.1–2.2)
ALP SERPL-CCNC: 81 U/L (ref 45–117)
ALT SERPL-CCNC: 18 U/L (ref 12–78)
ANION GAP SERPL CALC-SCNC: 6 MMOL/L (ref 5–15)
APPEARANCE UR: CLEAR
AST SERPL W P-5'-P-CCNC: 22 U/L (ref 15–37)
BACTERIA URNS QL MICRO: NEGATIVE /HPF
BASOPHILS # BLD: 0 K/UL (ref 0–0.2)
BASOPHILS NFR BLD: 1 % (ref 0–2.5)
BILIRUB SERPL-MCNC: 0.4 MG/DL (ref 0.2–1)
BILIRUB UR QL: NEGATIVE
BNP SERPL-MCNC: 58 PG/ML
BUN SERPL-MCNC: 20 MG/DL (ref 6–20)
BUN/CREAT SERPL: 16 (ref 12–20)
CA-I BLD-MCNC: 9.4 MG/DL (ref 8.5–10.1)
CHLORIDE SERPL-SCNC: 101 MMOL/L (ref 97–108)
CO2 SERPL-SCNC: 30 MMOL/L (ref 21–32)
COLOR UR: ABNORMAL
CREAT SERPL-MCNC: 1.26 MG/DL (ref 0.7–1.3)
EOSINOPHIL # BLD: 0.2 K/UL (ref 0–0.7)
EOSINOPHIL NFR BLD: 4 % (ref 0.9–2.9)
ERYTHROCYTE [DISTWIDTH] IN BLOOD BY AUTOMATED COUNT: 13.3 % (ref 11.5–14.5)
GLOBULIN SER CALC-MCNC: 3.3 G/DL (ref 2–4)
GLUCOSE SERPL-MCNC: 119 MG/DL (ref 65–100)
GLUCOSE UR STRIP.AUTO-MCNC: NEGATIVE MG/DL
HCT VFR BLD AUTO: 40.2 % (ref 41–53)
HGB BLD-MCNC: 13.8 G/DL (ref 13.5–17.5)
HGB UR QL STRIP: ABNORMAL
INR PPP: 1.1 (ref 0.9–1.1)
KETONES UR QL STRIP.AUTO: NEGATIVE MG/DL
LEUKOCYTE ESTERASE UR QL STRIP.AUTO: NEGATIVE
LYMPHOCYTES # BLD: 1 K/UL (ref 1–4.8)
LYMPHOCYTES NFR BLD: 25 % (ref 20.5–51.1)
MAGNESIUM SERPL-MCNC: 2.1 MG/DL (ref 1.6–2.4)
MCH RBC QN AUTO: 31.7 PG (ref 31–34)
MCHC RBC AUTO-ENTMCNC: 34.3 G/DL (ref 31–36)
MCV RBC AUTO: 92.3 FL (ref 80–100)
MONOCYTES # BLD: 0.5 K/UL (ref 0.2–2.4)
MONOCYTES NFR BLD: 14 % (ref 1.7–9.3)
NEUTS SEG # BLD: 2.2 K/UL (ref 1.8–7.7)
NEUTS SEG NFR BLD: 56 % (ref 42–75)
NITRITE UR QL STRIP.AUTO: NEGATIVE
NRBC # BLD: 0 K/UL
NRBC BLD-RTO: 0.1 PER 100 WBC
PH UR STRIP: 7 [PH] (ref 5–8)
PLATELET # BLD AUTO: 180 K/UL (ref 150–400)
PMV BLD AUTO: 7 FL (ref 6.5–11.5)
POTASSIUM SERPL-SCNC: 3.7 MMOL/L (ref 3.5–5.1)
PROT SERPL-MCNC: 7 G/DL (ref 6.4–8.2)
PROT UR STRIP-MCNC: NEGATIVE MG/DL
PROTHROMBIN TIME: 10.8 SEC (ref 9–11.1)
RBC # BLD AUTO: 4.35 M/UL (ref 4.5–5.9)
RBC #/AREA URNS HPF: NORMAL /HPF (ref 0–3)
SODIUM SERPL-SCNC: 137 MMOL/L (ref 136–145)
SP GR UR REFRACTOMETRY: 1.01 (ref 1–1.03)
TROPONIN-HIGH SENSITIVITY: 10 NG/L (ref 0–76)
TROPONIN-HIGH SENSITIVITY: 10 NG/L (ref 0–76)
UROBILINOGEN UR QL STRIP.AUTO: 0.2 EU/DL (ref 0.2–1)
WBC # BLD AUTO: 3.9 K/UL (ref 4.4–11.3)
WBC URNS QL MICRO: NORMAL /HPF (ref 0–5)

## 2022-02-09 PROCEDURE — 83735 ASSAY OF MAGNESIUM: CPT

## 2022-02-09 PROCEDURE — 81001 URINALYSIS AUTO W/SCOPE: CPT

## 2022-02-09 PROCEDURE — 99284 EMERGENCY DEPT VISIT MOD MDM: CPT

## 2022-02-09 PROCEDURE — 71045 X-RAY EXAM CHEST 1 VIEW: CPT

## 2022-02-09 PROCEDURE — 85025 COMPLETE CBC W/AUTO DIFF WBC: CPT

## 2022-02-09 PROCEDURE — 84484 ASSAY OF TROPONIN QUANT: CPT

## 2022-02-09 PROCEDURE — 83880 ASSAY OF NATRIURETIC PEPTIDE: CPT

## 2022-02-09 PROCEDURE — 36415 COLL VENOUS BLD VENIPUNCTURE: CPT

## 2022-02-09 PROCEDURE — 93005 ELECTROCARDIOGRAM TRACING: CPT

## 2022-02-09 PROCEDURE — 80053 COMPREHEN METABOLIC PANEL: CPT

## 2022-02-09 PROCEDURE — 74011250637 HC RX REV CODE- 250/637: Performed by: EMERGENCY MEDICINE

## 2022-02-09 PROCEDURE — 85610 PROTHROMBIN TIME: CPT

## 2022-02-09 RX ORDER — GUAIFENESIN 100 MG/5ML
243 LIQUID (ML) ORAL
Status: COMPLETED | OUTPATIENT
Start: 2022-02-09 | End: 2022-02-09

## 2022-02-09 RX ADMIN — ASPIRIN 81 MG 243 MG: 81 TABLET ORAL at 18:23

## 2022-02-09 NOTE — ED PROVIDER NOTES
EMERGENCY DEPARTMENT HISTORY AND PHYSICAL EXAM      Date: 2/9/2022  Patient Name: Lashanda Stephenson      History of Presenting Illness     Chief Complaint   Patient presents with    Chest Pain     pt presents with c/o chest and sob that started 1 hr prior to arrival, pt states that he has had these symptoms ongoing for 30 days, pt follows [address] [place] [address], and has appointment for stress test on monday. PT VS stable, pt states that he is pain free at this time, pt ambulatory and A&Ox4, pt states that he has a tight sensation in chest like his heart is pounding.  Shortness of Breath    Fatigue       History Provided By: Patient    HPI: Lashanda Stephenson, 80 y.o. male with a past medical history significant hypertension presents to the ED with cc of left-sided chest pain described as tightness pressure sensation intensity was 3/10, patient at the time was climbing the stairs after he had done his laundry denies any diaphoresis but did have shortness of breath he said he found it very hard to breathe when it occurred currently patient is pain-free, patient relates he had a similar episode yesterday but the pain was not as intense today's pain was worse    There are no other complaints, changes, or physical findings at this time. PCP: Yuly Martin NP    Current Facility-Administered Medications   Medication Dose Route Frequency Provider Last Rate Last Admin    aspirin chewable tablet 243 mg  243 mg Oral NOW Linette Tracy MD         Current Outpatient Medications   Medication Sig Dispense Refill    Dutasteride-Tamsulosin 0.5-0.4 mg CM24 TAKE 1 CAPSULE BY MOUTH EVERY DAY. Indications: enlarged prostate with urination problem 90 Capsule 0    omeprazole (PRILOSEC) 20 mg capsule TAKE 1 CAPSULE BY MOUTH EVERY DAY 90 Capsule 0    lisinopril-hydroCHLOROthiazide (PRINZIDE, ZESTORETIC) 10-12.5 mg per tablet Take 1 Tablet by mouth daily.  90 Tablet 1    diclofenac (VOLTAREN) 1 % gel  (Patient not taking: Reported on 2022)      aspirin delayed-release 81 mg tablet Take 81 mg by mouth daily.  gabapentin (NEURONTIN) 100 mg capsule daily as needed. Past History     Past Medical History:  Past Medical History:   Diagnosis Date    Benign prostatic hyperplasia 2017    Essential hypertension 2017    Full thickness rotator cuff tear 2017    Full thickness rotator cuff tear 2017    Glaucoma of left eye 2017    Glaucoma of left eye 2017    Left inguinal hernia 2017    Left inguinal hernia 2017       Past Surgical History:  Past Surgical History:   Procedure Laterality Date    HX CATARACT REMOVAL      HX CHOLECYSTECTOMY      HX HERNIA REPAIR  2016    Dr. Abner Ritchie    [address]  2020    Inguinal-Dr. Abner Ritchie    HX ORTHOPAEDIC Left     Shoulder    HX ORTHOPAEDIC Right     Shoulder    HX ORTHOPAEDIC Right 2018    Hammer toe surgery        Family History:  Family History   Problem Relation Age of Onset    Heart Disease Mother     Cancer Brother        Social History:  Social History     Tobacco Use    Smoking status: Former Smoker     Packs/day: 0.50     Years: 10.00     Pack years: 5.00     Quit date:      Years since quittin.1    Smokeless tobacco: Never Used   Substance Use Topics    Alcohol use: Never    Drug use: Never       Allergies:  No Known Allergies      Review of Systems     Review of Systems   Constitutional: Negative for chills and fever. HENT: Negative for rhinorrhea and sore throat. Eyes: Negative for pain and visual disturbance. Respiratory: Negative for cough and shortness of breath. Cardiovascular: Negative for chest pain and leg swelling. Gastrointestinal: Negative for abdominal pain and vomiting. Endocrine: Negative for polydipsia and polyuria. Genitourinary: Negative for dysuria and hematuria. Musculoskeletal: Negative for back pain and neck pain.    Skin: Negative for color change and pallor. Neurological: Negative for weakness and headaches. Psychiatric/Behavioral: Negative for agitation and suicidal ideas. Physical Exam     Physical Exam  Vitals and nursing note reviewed. Constitutional:       General: He is not in acute distress. Appearance: He is not ill-appearing, toxic-appearing or diaphoretic. HENT:      Head: Normocephalic and atraumatic. Right Ear: Tympanic membrane normal.      Left Ear: Tympanic membrane normal.      Nose: Nose normal. No congestion. Mouth/Throat:      Mouth: Mucous membranes are moist.      Pharynx: Oropharynx is clear. Eyes:      Extraocular Movements: Extraocular movements intact. Conjunctiva/sclera: Conjunctivae normal.      Pupils: Pupils are equal, round, and reactive to light. Cardiovascular:      Rate and Rhythm: Normal rate and regular rhythm. Pulses: Normal pulses. Heart sounds: Normal heart sounds. Pulmonary:      Effort: Pulmonary effort is normal.      Breath sounds: Normal breath sounds. Abdominal:      General: Bowel sounds are normal.      Palpations: Abdomen is soft. Tenderness: There is no abdominal tenderness. Musculoskeletal:         General: No tenderness, deformity or signs of injury. Normal range of motion. Cervical back: Normal range of motion and neck supple. No rigidity or tenderness. Lymphadenopathy:      Cervical: No cervical adenopathy. Skin:     General: Skin is warm and dry. Capillary Refill: Capillary refill takes less than 2 seconds. Findings: No rash. Neurological:      General: No focal deficit present. Mental Status: He is alert and oriented to person, place, and time. Cranial Nerves: No cranial nerve deficit. Sensory: No sensory deficit.    Psychiatric:         Mood and Affect: Mood normal.         Behavior: Behavior normal.         Lab and Diagnostic Study Results     Labs -     Recent Results (from the past 12 hour(s))   EKG, 12 LEAD, INITIAL    Collection Time: 02/09/22  5:49 PM   Result Value Ref Range    Ventricular Rate 66 BPM    Atrial Rate 68 BPM    P-R Interval 177 ms    QRS Duration 149 ms    Q-T Interval 451 ms    QTC Calculation (Bezet) 473 ms    Calculated P Axis 50 degrees    Calculated R Axis -80 degrees    Calculated T Axis 61 degrees    Diagnosis Sinus rhythm  RBBB and LAFB          Radiologic Studies -   [unfilled]  CT Results  (Last 48 hours)    None        CXR Results  (Last 48 hours)    None          Medical Decision Making and ED Course   - I am the first and primary provider for this patient AND AM THE PRIMARY PROVIDER OF RECORD. - I reviewed the vital signs, available nursing notes, past medical history, past surgical history, family history and social history. - Initial assessment performed. The patients presenting problems have been discussed, and the staff are in agreement with the care plan formulated and outlined with them. I have encouraged them to ask questions as they arise throughout their visit. Vital Signs-Reviewed the patient's vital signs. Patient Vitals for the past 12 hrs:   Temp Pulse Resp BP SpO2   02/09/22 1757 98 °F (36.7 °C) 62 20 (!) 154/85 100 %       Records Reviewed: Nursing Notes    The patient presents with chest pain with a differential diagnosis of  ACS, arrhythmia, pulmonary edema/CHF and pnuemonia    ED Course:       ED Course as of 02/09/22 1955 Wed Feb 09, 2022   [facility] Cardiology consult requested [SB]      ED Course User Index  [SB] Jude Verde MD         Provider Notes (Medical Decision Making): MDM           Consultations:       Consultations: - NONE        Procedures and Critical Care       Performed by: Brit Mclaughlin MD  PROCEDURES:  Procedures         HYPERTENSION COUNSELING: Education was provided to the patient today regarding their hypertension.  Patient is made aware of their elevated blood pressure and is instructed to follow up this week with their Primary Care for a recheck. Patient is counseled regarding consequences of chronic, uncontrolled hypertension including kidney disease, heart disease, stroke or even death. Patient states their understanding and agrees to follow up this week. Additionally, during their visit, I discussed sodium restriction, maintaining ideal body weight and regular exercise program as physiologic means to achieve blood pressure control. The patient will strive towards this. Jennifer Patel MD        Disposition     Disposition: Condition stable and improved  DC- Adult Discharges: All of the diagnostic tests were reviewed and questions answered. Diagnosis, care plan and treatment options were discussed. The patient understands the instructions and will follow up as directed. The patients results have been reviewed with them. They have been counseled regarding their diagnosis. The patient verbally convey understanding and agreement of the signs, symptoms, diagnosis, treatment and prognosis and additionally agrees to follow up as recommended with their PCP in 24 - 48 hours. They also agree with the care-plan and convey that all of their questions have been answered. I have also put together some discharge instructions for them that include: 1) educational information regarding their diagnosis, 2) how to care for their diagnosis at home, as well a 3) list of reasons why they would want to return to the ED prior to their follow-up appointment, should their condition change. Remove if not discharged  DISCHARGE PLAN:  1. Current Discharge Medication List      CONTINUE these medications which have NOT CHANGED    Details   Dutasteride-Tamsulosin 0.5-0.4 mg CM24 TAKE 1 CAPSULE BY MOUTH EVERY DAY.   Indications: enlarged prostate with urination problem  Qty: 90 Capsule, Refills: 0    Associated Diagnoses: Benign prostatic hyperplasia with lower urinary tract symptoms      omeprazole (PRILOSEC) 20 mg capsule TAKE 1 CAPSULE BY MOUTH EVERY DAY  Qty: 90 Capsule, Refills: 0    Comments: DX Code Needed  . Associated Diagnoses: Gastroesophageal reflux disease without esophagitis      lisinopril-hydroCHLOROthiazide (PRINZIDE, ZESTORETIC) 10-12.5 mg per tablet Take 1 Tablet by mouth daily. Qty: 90 Tablet, Refills: 1    Associated Diagnoses: Essential hypertension      diclofenac (VOLTAREN) 1 % gel       aspirin delayed-release 81 mg tablet Take 81 mg by mouth daily. gabapentin (NEURONTIN) 100 mg capsule daily as needed. 2.   Follow-up Information    None       3. Return to ED if worse   4. Current Discharge Medication List          Diagnosis     Clinical Impression: No diagnosis found. Attestations:    Ray Nash MD    Please note that this dictation was completed with [employer], the computer voice recognition software. Quite often unanticipated grammatical, syntax, homophones, and other interpretive errors are inadvertently transcribed by the computer software. Please disregard these errors. Please excuse any errors that have escaped final proofreading. Thank you.

## 2022-02-11 LAB
ATRIAL RATE: 68 BPM
CALCULATED P AXIS, ECG09: 50 DEGREES
CALCULATED R AXIS, ECG10: -80 DEGREES
CALCULATED T AXIS, ECG11: 61 DEGREES
DIAGNOSIS, 93000: NORMAL
P-R INTERVAL, ECG05: 177 MS
Q-T INTERVAL, ECG07: 451 MS
QRS DURATION, ECG06: 149 MS
QTC CALCULATION (BEZET), ECG08: 473 MS
VENTRICULAR RATE, ECG03: 66 BPM

## 2022-03-01 DIAGNOSIS — I10 ESSENTIAL HYPERTENSION: ICD-10-CM

## 2022-03-01 RX ORDER — LISINOPRIL AND HYDROCHLOROTHIAZIDE 10; 12.5 MG/1; MG/1
1 TABLET ORAL DAILY
Qty: 90 TABLET | Refills: 0 | Status: SHIPPED | OUTPATIENT
Start: 2022-03-01 | End: 2022-05-27

## 2022-03-18 PROBLEM — K40.90 RIGHT INGUINAL HERNIA: Status: ACTIVE | Noted: 2020-09-10

## 2022-03-19 PROBLEM — M54.2 CHRONIC NECK AND BACK PAIN: Status: ACTIVE | Noted: 2020-11-29

## 2022-03-19 PROBLEM — G89.29 CHRONIC NECK AND BACK PAIN: Status: ACTIVE | Noted: 2020-11-29

## 2022-03-19 PROBLEM — M54.9 CHRONIC NECK AND BACK PAIN: Status: ACTIVE | Noted: 2020-11-29

## 2022-03-19 PROBLEM — I10 ESSENTIAL HYPERTENSION: Status: ACTIVE | Noted: 2017-06-05

## 2022-03-20 PROBLEM — N40.0 BENIGN PROSTATIC HYPERPLASIA: Status: ACTIVE | Noted: 2017-06-05

## 2022-03-24 ENCOUNTER — OFFICE VISIT (OUTPATIENT)
Dept: PRIMARY CARE CLINIC | Age: 82
End: 2022-03-24
Payer: MEDICARE

## 2022-03-24 VITALS
TEMPERATURE: 97.1 F | HEIGHT: 72 IN | SYSTOLIC BLOOD PRESSURE: 134 MMHG | HEART RATE: 63 BPM | BODY MASS INDEX: 20.45 KG/M2 | DIASTOLIC BLOOD PRESSURE: 64 MMHG | RESPIRATION RATE: 18 BRPM | OXYGEN SATURATION: 99 % | WEIGHT: 151 LBS

## 2022-03-24 DIAGNOSIS — M25.562 ACUTE PAIN OF LEFT KNEE: Primary | ICD-10-CM

## 2022-03-24 PROBLEM — Z95.5 STENTED CORONARY ARTERY: Status: ACTIVE | Noted: 2022-03-24

## 2022-03-24 PROCEDURE — G8432 DEP SCR NOT DOC, RNG: HCPCS | Performed by: NURSE PRACTITIONER

## 2022-03-24 PROCEDURE — G8754 DIAS BP LESS 90: HCPCS | Performed by: NURSE PRACTITIONER

## 2022-03-24 PROCEDURE — 1101F PT FALLS ASSESS-DOCD LE1/YR: CPT | Performed by: NURSE PRACTITIONER

## 2022-03-24 PROCEDURE — 99213 OFFICE O/P EST LOW 20 MIN: CPT | Performed by: NURSE PRACTITIONER

## 2022-03-24 PROCEDURE — G8536 NO DOC ELDER MAL SCRN: HCPCS | Performed by: NURSE PRACTITIONER

## 2022-03-24 PROCEDURE — G8420 CALC BMI NORM PARAMETERS: HCPCS | Performed by: NURSE PRACTITIONER

## 2022-03-24 PROCEDURE — G8427 DOCREV CUR MEDS BY ELIG CLIN: HCPCS | Performed by: NURSE PRACTITIONER

## 2022-03-24 PROCEDURE — G8752 SYS BP LESS 140: HCPCS | Performed by: NURSE PRACTITIONER

## 2022-03-24 RX ORDER — ATORVASTATIN CALCIUM 40 MG/1
1 TABLET, FILM COATED ORAL DAILY
COMMUNITY

## 2022-03-24 RX ORDER — PREDNISONE 20 MG/1
TABLET ORAL
Qty: 9 TABLET | Refills: 0 | Status: SHIPPED | OUTPATIENT
Start: 2022-03-24 | End: 2022-04-12 | Stop reason: ALTCHOICE

## 2022-03-24 RX ORDER — NITROGLYCERIN 0.4 MG/1
TABLET SUBLINGUAL
COMMUNITY
Start: 2022-02-23

## 2022-03-24 RX ORDER — CLOPIDOGREL BISULFATE 75 MG/1
1 TABLET ORAL DAILY
COMMUNITY

## 2022-03-24 RX ORDER — METOPROLOL TARTRATE 25 MG/1
0.5 TABLET, FILM COATED ORAL 2 TIMES DAILY
COMMUNITY

## 2022-03-24 NOTE — PROGRESS NOTES
Chief Complaint   Patient presents with    Knee Pain     Left knee, its been going for Quit sometime. Recently its been getting worse in the last month     1. Have you been to the ER, urgent care clinic since your last visit? Hospitalized since your last visit? yes [facility]    2. Have you seen or consulted any other health care providers outside of the [address] [place] [address] since your last visit? Include any pap smears or colon screening.  Yes [facility] for heart cath

## 2022-03-24 NOTE — PROGRESS NOTES
Fela Alva is a 80 y.o. male who presents to the office today for the following:    Chief Complaint   Patient presents with    Knee Pain       Past Medical History:   Diagnosis Date    Benign prostatic hyperplasia 2017    Essential hypertension 2017    Full thickness rotator cuff tear 2017    Full thickness rotator cuff tear 2017    Glaucoma of left eye 2017    Glaucoma of left eye 2017    Left inguinal hernia 2017    Left inguinal hernia 2017    Stented coronary artery 3/24/2022       Past Surgical History:   Procedure Laterality Date    HX CATARACT REMOVAL      HX CHOLECYSTECTOMY      HX HEART CATHETERIZATION  2022    HX HERNIA REPAIR  2016    Dr. Virginia Maurer    [address]  2020    Inguinal-Dr. Virginia Maurer    HX ORTHOPAEDIC Left     Shoulder    HX ORTHOPAEDIC Right     Shoulder    HX ORTHOPAEDIC Right 2018    Hammer toe surgery         Family History   Problem Relation Age of Onset    Heart Disease Mother     Cancer Brother         Social History     Tobacco Use    Smoking status: Former Smoker     Packs/day: 0.50     Years: 10.00     Pack years: 5.00     Quit date:      Years since quittin.2    Smokeless tobacco: Never Used   Substance Use Topics    Alcohol use: Never    Drug use: Never        HPI  Patient here today with PMH of CAD w/ stent, hypertension, GERD, BPH, neuropathy and hyperlipidemia. States that he recently started cardiac rehab after having stent placed. Had been having pain in his knee off and on for several months but did not have time to \"deal\" with this due to he was having tests with cardiology. After few weeks of cardiac rehab, noticed pain in knee was worsening so for last few sessions has had them stop exercises with the knee. Has used some topical rub which helped some. No prior injuries to knee.  No imaging done in past.      Current Outpatient Medications on File Prior to Visit   Medication Sig    clopidogreL (PLAVIX) 75 mg tab Take 1 Tablet by mouth daily.  atorvastatin (LIPITOR) 40 mg tablet Take 1 Tablet by mouth daily.  metoprolol tartrate (LOPRESSOR) 25 mg tablet Take 0.5 Tablets by mouth two (2) times a day.  nitroglycerin (NITROSTAT) 0.4 mg SL tablet     lisinopril-hydroCHLOROthiazide (PRINZIDE, ZESTORETIC) 10-12.5 mg per tablet Take 1 Tablet by mouth daily.  Dutasteride-Tamsulosin 0.5-0.4 mg CM24 TAKE 1 CAPSULE BY MOUTH EVERY DAY. Indications: enlarged prostate with urination problem    omeprazole (PRILOSEC) 20 mg capsule TAKE 1 CAPSULE BY MOUTH EVERY DAY    [DISCONTINUED] diclofenac (VOLTAREN) 1 % gel  (Patient not taking: Reported on 1/25/2022)    aspirin delayed-release 81 mg tablet Take 81 mg by mouth daily.  gabapentin (NEURONTIN) 100 mg capsule Take 2 Capsules by mouth daily. No current facility-administered medications on file prior to visit. Medications Ordered Today   Medications    predniSONE (DELTASONE) 20 mg tablet     Sig: Take 2 tablets by mouth x 3 days then 1 tablet by mouth x 3 days     Dispense:  9 Tablet     Refill:  0        Review of Systems   Constitutional: Negative. Respiratory: Negative. Cardiovascular: Negative. Gastrointestinal: Negative. Genitourinary: Negative. Musculoskeletal: Positive for joint pain (left knee) and myalgias. Neurological: Positive for tingling (bilateral feet) and sensory change. Negative for dizziness and headaches. Visit Vitals  /64 (BP 1 Location: Left upper arm, BP Patient Position: Sitting, BP Cuff Size: Adult)   Pulse 63   Temp 97.1 °F (36.2 °C) (Temporal)   Resp 18   Ht 6' (1.829 m)   Wt 151 lb (68.5 kg)   SpO2 99%   BMI 20.48 kg/m²       Physical Exam  Vitals and nursing note reviewed. Constitutional:       Appearance: Normal appearance. Cardiovascular:      Rate and Rhythm: Normal rate and regular rhythm. Pulses: Normal pulses.    Pulmonary:      Effort: Pulmonary effort is normal.      Breath sounds: Normal breath sounds. Abdominal:      General: Bowel sounds are normal.      Palpations: Abdomen is soft. Tenderness: There is no abdominal tenderness. Musculoskeletal:      Right knee: Normal.      Left knee: No swelling or erythema. Normal range of motion. Tenderness present. Normal pulse. Right lower leg: No edema. Left lower leg: No edema. Legs:    Skin:     General: Skin is warm and dry. Neurological:      Mental Status: He is alert and oriented to person, place, and time. Gait: Gait abnormal.            1. Acute pain of left knee  Pain off and on for several months but more persistent after starting cardiac rehab  Has stopped exercises in cardiac rehab for past few visits that aggravate  Cannot take NSAIDS but may continue topical voltaren and tylenol prn  Encourage heat and range of motion exercises  Will give short course of prednisone as directed  If not improving, provided order for xray and will re-evaluate in 2 weeks. - XR KNEE LT MAX 2 VWS; Future  - predniSONE (DELTASONE) 20 mg tablet; Take 2 tablets by mouth x 3 days then 1 tablet by mouth x 3 days  Dispense: 9 Tablet; Refill: 0      Patient verbalizes understanding of plan of care as discussed above    Follow-up and Dispositions    · Return in about 2 weeks (around 4/7/2022) for or sooner for worsening symptoms.

## 2022-03-25 PROBLEM — Z95.5 STENTED CORONARY ARTERY: Status: ACTIVE | Noted: 2022-03-24

## 2022-04-08 DIAGNOSIS — K21.9 GASTROESOPHAGEAL REFLUX DISEASE WITHOUT ESOPHAGITIS: ICD-10-CM

## 2022-04-08 DIAGNOSIS — N40.1 BENIGN PROSTATIC HYPERPLASIA WITH LOWER URINARY TRACT SYMPTOMS: ICD-10-CM

## 2022-04-08 RX ORDER — DUTASTERIDE AND TAMSULOSIN HYDROCHLORIDE CAPSULES .5; .4 MG/1; MG/1
CAPSULE ORAL
Qty: 90 CAPSULE | Refills: 0 | Status: SHIPPED | OUTPATIENT
Start: 2022-04-08 | End: 2022-07-07

## 2022-04-08 RX ORDER — OMEPRAZOLE 20 MG/1
CAPSULE, DELAYED RELEASE ORAL
Qty: 90 CAPSULE | Refills: 0 | Status: SHIPPED | OUTPATIENT
Start: 2022-04-08 | End: 2022-07-08

## 2022-04-12 ENCOUNTER — OFFICE VISIT (OUTPATIENT)
Dept: PRIMARY CARE CLINIC | Age: 82
End: 2022-04-12
Payer: MEDICARE

## 2022-04-12 VITALS
OXYGEN SATURATION: 99 % | BODY MASS INDEX: 20.45 KG/M2 | HEIGHT: 72 IN | RESPIRATION RATE: 17 BRPM | DIASTOLIC BLOOD PRESSURE: 60 MMHG | SYSTOLIC BLOOD PRESSURE: 138 MMHG | TEMPERATURE: 96.8 F | HEART RATE: 71 BPM | WEIGHT: 151 LBS

## 2022-04-12 DIAGNOSIS — L72.3 SEBACEOUS CYST OF AXILLA: ICD-10-CM

## 2022-04-12 DIAGNOSIS — M25.562 ACUTE PAIN OF LEFT KNEE: Primary | ICD-10-CM

## 2022-04-12 PROCEDURE — G8536 NO DOC ELDER MAL SCRN: HCPCS | Performed by: NURSE PRACTITIONER

## 2022-04-12 PROCEDURE — 1101F PT FALLS ASSESS-DOCD LE1/YR: CPT | Performed by: NURSE PRACTITIONER

## 2022-04-12 PROCEDURE — G8432 DEP SCR NOT DOC, RNG: HCPCS | Performed by: NURSE PRACTITIONER

## 2022-04-12 PROCEDURE — G8427 DOCREV CUR MEDS BY ELIG CLIN: HCPCS | Performed by: NURSE PRACTITIONER

## 2022-04-12 PROCEDURE — G8754 DIAS BP LESS 90: HCPCS | Performed by: NURSE PRACTITIONER

## 2022-04-12 PROCEDURE — G8420 CALC BMI NORM PARAMETERS: HCPCS | Performed by: NURSE PRACTITIONER

## 2022-04-12 PROCEDURE — 99213 OFFICE O/P EST LOW 20 MIN: CPT | Performed by: NURSE PRACTITIONER

## 2022-04-12 PROCEDURE — G8752 SYS BP LESS 140: HCPCS | Performed by: NURSE PRACTITIONER

## 2022-04-12 NOTE — PROGRESS NOTES
Chief Complaint   Patient presents with    Knee Pain     2 week f/u    1. Have you been to the ER, urgent care clinic since your last visit? Hospitalized since your last visit? No    2. Have you seen or consulted any other health care providers outside of the [address] [place] [address] since your last visit? Include any pap smears or colon screening.  No

## 2022-04-12 NOTE — PROGRESS NOTES
Maicol Gaston is a 80 y.o. male who presents to the office today for the following:    Chief Complaint   Patient presents with    Knee Pain       Past Medical History:   Diagnosis Date    Benign prostatic hyperplasia 2017    Essential hypertension 2017    Full thickness rotator cuff tear 2017    Full thickness rotator cuff tear 2017    Glaucoma of left eye 2017    Glaucoma of left eye 2017    Left inguinal hernia 2017    Left inguinal hernia 2017    Stented coronary artery 3/24/2022       Past Surgical History:   Procedure Laterality Date    HX CATARACT REMOVAL      HX CHOLECYSTECTOMY      HX HEART CATHETERIZATION  2022    HX HERNIA REPAIR  2016    Dr. Hunter Perdomo    [address]  2020    Inguinal-Dr. Hunter Perdomo    HX ORTHOPAEDIC Left     Shoulder    HX ORTHOPAEDIC Right     Shoulder    HX ORTHOPAEDIC Right 2018    Hammer toe surgery         Family History   Problem Relation Age of Onset    Heart Disease Mother     Cancer Brother         Social History     Tobacco Use    Smoking status: Former Smoker     Packs/day: 0.50     Years: 10.00     Pack years: 5.00     Quit date:      Years since quittin.3    Smokeless tobacco: Never Used   Substance Use Topics    Alcohol use: Never    Drug use: Never        HPI   Patient here today with PMH of CAD w/ stent, hypertension, GERD, BPH, neuropathy and hyperlipidemia who is following for left knee pain. States that he did complete the prednisone which he feels helped some. Did start resuming exercises using legs at cardiac rehab as he did not  feel pain  bad enough anymore to stop. Also thinks that he does not want xray or other intervention at this time. Would like a small knot under his arm looked at. Has been there for \"some time\" and one time had some thick drainage come out. Noticed it was getting bigger again but is not hurting or red.      Current Outpatient Medications on File Prior to Visit   Medication Sig    omeprazole (PRILOSEC) 20 mg capsule TAKE 1 CAPSULE BY MOUTH EVERY DAY    Dutasteride-Tamsulosin 0.5-0.4 mg CM24 TAKE 1 CAPSULE BY MOUTH EVERY DAY. INDICATIONS: ENLARGED PROSTATE WITH URINATION PROBLEM    nitroglycerin (NITROSTAT) 0.4 mg SL tablet     clopidogreL (PLAVIX) 75 mg tab Take 1 Tablet by mouth daily.  atorvastatin (LIPITOR) 40 mg tablet Take 1 Tablet by mouth daily.  metoprolol tartrate (LOPRESSOR) 25 mg tablet Take 0.5 Tablets by mouth two (2) times a day.  [DISCONTINUED] predniSONE (DELTASONE) 20 mg tablet Take 2 tablets by mouth x 3 days then 1 tablet by mouth x 3 days    lisinopril-hydroCHLOROthiazide (PRINZIDE, ZESTORETIC) 10-12.5 mg per tablet Take 1 Tablet by mouth daily.  aspirin delayed-release 81 mg tablet Take 81 mg by mouth daily.  gabapentin (NEURONTIN) 100 mg capsule Take 2 Capsules by mouth daily. No current facility-administered medications on file prior to visit. No orders of the defined types were placed in this encounter. Review of Systems   Constitutional: Negative. Respiratory: Negative. Cardiovascular: Negative. Gastrointestinal: Negative. Genitourinary: Negative. Musculoskeletal: Positive for joint pain (left knee) and myalgias. Skin:        Cyst under right axilla   Neurological: Positive for tingling (bilateral feet) and sensory change. Negative for dizziness and headaches. Visit Vitals  /60 (BP 1 Location: Left upper arm, BP Patient Position: Sitting, BP Cuff Size: Adult)   Pulse 71   Temp 96.8 °F (36 °C) (Temporal)   Resp 17   Ht 6' (1.829 m)   Wt 151 lb (68.5 kg)   SpO2 99%   BMI 20.48 kg/m²       Physical Exam  Vitals and nursing note reviewed. Constitutional:       Appearance: Normal appearance. Cardiovascular:      Rate and Rhythm: Normal rate. Pulses: Normal pulses.    Pulmonary:      Effort: Pulmonary effort is normal.   Abdominal:      General: Bowel sounds are normal. Palpations: Abdomen is soft. Musculoskeletal:      Right knee: Normal.      Left knee: No swelling or erythema. Normal range of motion. Tenderness present. Normal pulse. Right lower leg: No edema. Left lower leg: No edema. Legs:    Skin:     General: Skin is warm and dry. Comments: Small, non-tender hardened nodule with center black head   Neurological:      Mental Status: He is alert and oriented to person, place, and time. Gait: Gait abnormal.            1. Acute pain of left knee  Feels that symptoms are improved but does still have intermittent pain  Does not desire further intervention or tests at this time  Will continue tylenol and heat prn  Encourage range of motion exercises  If pain worsening, he will follow up    2. Sebaceous cyst of axilla  Has had some sebum come out and flattened but seems to be increasing in size again  Encourage warm compresses over next 2-3 days   Declines having I&D   He will let us know if continues to increase in size or if pain,redness or other symptoms develop      Patient verbalizes understanding of plan of care as discussed above    Follow-up and Dispositions    · Return in about 3 months (around 7/12/2022) for or sooner for worsening symptoms.

## 2022-05-09 ENCOUNTER — TELEPHONE (OUTPATIENT)
Dept: PRIMARY CARE CLINIC | Age: 82
End: 2022-05-09

## 2022-05-10 ENCOUNTER — OFFICE VISIT (OUTPATIENT)
Dept: PRIMARY CARE CLINIC | Age: 82
End: 2022-05-10
Payer: MEDICARE

## 2022-05-10 VITALS
HEIGHT: 72 IN | DIASTOLIC BLOOD PRESSURE: 67 MMHG | RESPIRATION RATE: 18 BRPM | OXYGEN SATURATION: 98 % | SYSTOLIC BLOOD PRESSURE: 138 MMHG | BODY MASS INDEX: 20.42 KG/M2 | WEIGHT: 150.8 LBS | HEART RATE: 62 BPM | TEMPERATURE: 97.1 F

## 2022-05-10 DIAGNOSIS — E87.6 HYPOKALEMIA: ICD-10-CM

## 2022-05-10 DIAGNOSIS — I49.3 PVC'S (PREMATURE VENTRICULAR CONTRACTIONS): Primary | ICD-10-CM

## 2022-05-10 PROBLEM — M50.30 DEGENERATION OF CERVICAL INTERVERTEBRAL DISC: Status: ACTIVE | Noted: 2019-05-07

## 2022-05-10 PROCEDURE — 1101F PT FALLS ASSESS-DOCD LE1/YR: CPT | Performed by: NURSE PRACTITIONER

## 2022-05-10 PROCEDURE — G8432 DEP SCR NOT DOC, RNG: HCPCS | Performed by: NURSE PRACTITIONER

## 2022-05-10 PROCEDURE — G8420 CALC BMI NORM PARAMETERS: HCPCS | Performed by: NURSE PRACTITIONER

## 2022-05-10 PROCEDURE — 99214 OFFICE O/P EST MOD 30 MIN: CPT | Performed by: NURSE PRACTITIONER

## 2022-05-10 PROCEDURE — G8752 SYS BP LESS 140: HCPCS | Performed by: NURSE PRACTITIONER

## 2022-05-10 PROCEDURE — G8536 NO DOC ELDER MAL SCRN: HCPCS | Performed by: NURSE PRACTITIONER

## 2022-05-10 PROCEDURE — G8754 DIAS BP LESS 90: HCPCS | Performed by: NURSE PRACTITIONER

## 2022-05-10 PROCEDURE — G8427 DOCREV CUR MEDS BY ELIG CLIN: HCPCS | Performed by: NURSE PRACTITIONER

## 2022-05-10 RX ORDER — DICLOFENAC SODIUM 10 MG/G
GEL TOPICAL
COMMUNITY
Start: 2022-04-12 | End: 2022-07-14 | Stop reason: SDUPTHER

## 2022-05-10 NOTE — PROGRESS NOTES
Chief Complaint   Patient presents with   [facility] Follow-up    Abnormal Lab Results     Low potassium and mag    1. Have you been to the ER, urgent care clinic since your last visit? Hospitalized since your last visit? No    2. Have you seen or consulted any other health care providers outside of the [address] [place] [address] since your last visit? Include any pap smears or colon screening.  cardio teresa booth and SEVERO guajardo

## 2022-05-10 NOTE — PROGRESS NOTES
Jamarcus Rangel is a 80 y.o. male who presents to the office today for the following:    Chief Complaint   Patient presents with   [facility] Follow-up    Abnormal Lab Results       Past Medical History:   Diagnosis Date    Benign prostatic hyperplasia 2017    Essential hypertension 2017    Full thickness rotator cuff tear 2017    Full thickness rotator cuff tear 2017    Glaucoma of left eye 2017    Glaucoma of left eye 2017    Left inguinal hernia 2017    Left inguinal hernia 2017    Stented coronary artery 3/24/2022       Past Surgical History:   Procedure Laterality Date    HX CATARACT REMOVAL      HX CHOLECYSTECTOMY      HX HEART CATHETERIZATION  2022    HX HERNIA REPAIR  2016    Dr. Tiesha Grande    [address]  2020    Inguinal-Dr. Tiesha Grande    HX ORTHOPAEDIC Left     Shoulder    HX ORTHOPAEDIC Right     Shoulder    HX ORTHOPAEDIC Right 2018    Hammer toe surgery         Family History   Problem Relation Age of Onset    Heart Disease Mother     Cancer Brother         Social History     Tobacco Use    Smoking status: Former Smoker     Packs/day: 0.50     Years: 10.00     Pack years: 5.00     Quit date:      Years since quittin.3    Smokeless tobacco: Never Used   Substance Use Topics    Alcohol use: Never    Drug use: Never        HPI  Patient here today for follow up from ED on 22 due to frequent PVC's with PMH of CAD w/ stent, hypertension, GERD, BPH, neuropathy and hyperlipidemia. States that he was in cardiac rehab when he was noted to have some irregular heart beats. Denied having any palpations, chest pain or sob asossciated. Was sent to ED and had labs, cxr and EKG done. Given some potassium as this was slightly decreased.      Current Outpatient Medications on File Prior to Visit   Medication Sig    diclofenac (VOLTAREN) 1 % gel USE 2 GRAMS AS DIRECTED TO THE AFFECTED AREA 4 TIMES A DAY    omeprazole (PRILOSEC) 20 mg capsule TAKE 1 CAPSULE BY MOUTH EVERY DAY    Dutasteride-Tamsulosin 0.5-0.4 mg CM24 TAKE 1 CAPSULE BY MOUTH EVERY DAY. INDICATIONS: ENLARGED PROSTATE WITH URINATION PROBLEM    nitroglycerin (NITROSTAT) 0.4 mg SL tablet     clopidogreL (PLAVIX) 75 mg tab Take 1 Tablet by mouth daily.  atorvastatin (LIPITOR) 40 mg tablet Take 1 Tablet by mouth daily.  metoprolol tartrate (LOPRESSOR) 25 mg tablet Take 0.5 Tablets by mouth two (2) times a day.  lisinopril-hydroCHLOROthiazide (PRINZIDE, ZESTORETIC) 10-12.5 mg per tablet Take 1 Tablet by mouth daily.  aspirin delayed-release 81 mg tablet Take 81 mg by mouth daily.  gabapentin (NEURONTIN) 100 mg capsule Take 2 Capsules by mouth daily. No current facility-administered medications on file prior to visit. No orders of the defined types were placed in this encounter. Review of Systems   Constitutional: Negative for chills, diaphoresis, fever, malaise/fatigue and weight loss. Respiratory: Negative. Cardiovascular: Negative for chest pain, palpitations, orthopnea, claudication and leg swelling. Gastrointestinal: Negative. Genitourinary: Negative. Musculoskeletal: Positive for myalgias. Neurological: Negative for dizziness and headaches. Visit Vitals  /67 (BP 1 Location: Left upper arm, BP Patient Position: Sitting, BP Cuff Size: Adult)   Pulse 62   Temp 97.1 °F (36.2 °C) (Temporal)   Resp 18   Ht 6' (1.829 m)   Wt 150 lb 12.8 oz (68.4 kg)   SpO2 98%   BMI 20.45 kg/m²       Physical Exam  Vitals and nursing note reviewed. Constitutional:       Appearance: Normal appearance. Cardiovascular:      Rate and Rhythm: Normal rate. Pulses: Normal pulses. Heart sounds: Normal heart sounds. Pulmonary:      Effort: Pulmonary effort is normal.      Breath sounds: Normal breath sounds. Abdominal:      General: Bowel sounds are normal.      Palpations: Abdomen is soft. Tenderness:  There is no abdominal tenderness. Musculoskeletal:         General: Normal range of motion. Right lower leg: No edema. Left lower leg: No edema. Skin:     General: Skin is warm and dry. Neurological:      Mental Status: He is alert and oriented to person, place, and time. Mental status is at baseline. 1. PVC's (premature ventricular contractions)  Denies any chest pain or other symptoms  Monitoring electrolytes and TSH and trop ok   Will be following up with his cardiologist and reports they were notified as he is in cardiac rehab    2. Hypokalemia  K 3.4 and Mag 1.7  Given replacement in ED and will repeat labs in 2 weeks    Patient verbalizes understanding of plan of care as discussed above    Follow-up and Dispositions    · Return in about 2 weeks (around 5/24/2022), or lab visit and continue with follow up in July 2022.

## 2022-05-25 LAB
BUN SERPL-MCNC: 14 MG/DL (ref 8–27)
BUN/CREAT SERPL: 13 (ref 10–24)
CALCIUM SERPL-MCNC: 9.5 MG/DL (ref 8.6–10.2)
CHLORIDE SERPL-SCNC: 102 MMOL/L (ref 96–106)
CO2 SERPL-SCNC: 27 MMOL/L (ref 20–29)
CREAT SERPL-MCNC: 1.11 MG/DL (ref 0.76–1.27)
EGFR: 67 ML/MIN/1.73
GLUCOSE SERPL-MCNC: 101 MG/DL (ref 65–99)
MAGNESIUM SERPL-MCNC: 1.9 MG/DL (ref 1.6–2.3)
POTASSIUM SERPL-SCNC: 3.9 MMOL/L (ref 3.5–5.2)
SODIUM SERPL-SCNC: 142 MMOL/L (ref 134–144)

## 2022-05-25 NOTE — PROGRESS NOTES
LVM that all lab work came back normal per [facility], NP. If any questions or concerns, to call our office.

## 2022-05-27 DIAGNOSIS — I10 ESSENTIAL HYPERTENSION: ICD-10-CM

## 2022-05-27 RX ORDER — LISINOPRIL AND HYDROCHLOROTHIAZIDE 10; 12.5 MG/1; MG/1
TABLET ORAL
Qty: 90 TABLET | Refills: 0 | Status: SHIPPED | OUTPATIENT
Start: 2022-05-27 | End: 2022-09-01

## 2022-07-07 DIAGNOSIS — N40.1 BENIGN PROSTATIC HYPERPLASIA WITH LOWER URINARY TRACT SYMPTOMS: ICD-10-CM

## 2022-07-07 RX ORDER — DUTASTERIDE AND TAMSULOSIN HYDROCHLORIDE CAPSULES .5; .4 MG/1; MG/1
CAPSULE ORAL
Qty: 90 CAPSULE | Refills: 0 | Status: SHIPPED | OUTPATIENT
Start: 2022-07-07 | End: 2022-10-09

## 2022-07-08 DIAGNOSIS — K21.9 GASTROESOPHAGEAL REFLUX DISEASE WITHOUT ESOPHAGITIS: ICD-10-CM

## 2022-07-08 RX ORDER — OMEPRAZOLE 20 MG/1
CAPSULE, DELAYED RELEASE ORAL
Qty: 90 CAPSULE | Refills: 0 | Status: SHIPPED | OUTPATIENT
Start: 2022-07-08 | End: 2022-07-14 | Stop reason: SDUPTHER

## 2022-07-14 ENCOUNTER — OFFICE VISIT (OUTPATIENT)
Dept: PRIMARY CARE CLINIC | Age: 82
End: 2022-07-14
Payer: MEDICARE

## 2022-07-14 VITALS
SYSTOLIC BLOOD PRESSURE: 134 MMHG | HEART RATE: 61 BPM | RESPIRATION RATE: 18 BRPM | HEIGHT: 72 IN | DIASTOLIC BLOOD PRESSURE: 66 MMHG | BODY MASS INDEX: 19.59 KG/M2 | TEMPERATURE: 96.9 F | OXYGEN SATURATION: 98 % | WEIGHT: 144.6 LBS

## 2022-07-14 DIAGNOSIS — M79.641 RIGHT HAND PAIN: ICD-10-CM

## 2022-07-14 DIAGNOSIS — K21.9 GASTROESOPHAGEAL REFLUX DISEASE WITHOUT ESOPHAGITIS: ICD-10-CM

## 2022-07-14 DIAGNOSIS — M79.89 LEFT LEG SWELLING: ICD-10-CM

## 2022-07-14 DIAGNOSIS — M25.562 ACUTE PAIN OF LEFT KNEE: Primary | ICD-10-CM

## 2022-07-14 PROCEDURE — G8536 NO DOC ELDER MAL SCRN: HCPCS | Performed by: NURSE PRACTITIONER

## 2022-07-14 PROCEDURE — 99214 OFFICE O/P EST MOD 30 MIN: CPT | Performed by: NURSE PRACTITIONER

## 2022-07-14 PROCEDURE — 1123F ACP DISCUSS/DSCN MKR DOCD: CPT | Performed by: NURSE PRACTITIONER

## 2022-07-14 PROCEDURE — G8420 CALC BMI NORM PARAMETERS: HCPCS | Performed by: NURSE PRACTITIONER

## 2022-07-14 PROCEDURE — G8752 SYS BP LESS 140: HCPCS | Performed by: NURSE PRACTITIONER

## 2022-07-14 PROCEDURE — G8432 DEP SCR NOT DOC, RNG: HCPCS | Performed by: NURSE PRACTITIONER

## 2022-07-14 PROCEDURE — G8754 DIAS BP LESS 90: HCPCS | Performed by: NURSE PRACTITIONER

## 2022-07-14 PROCEDURE — G8427 DOCREV CUR MEDS BY ELIG CLIN: HCPCS | Performed by: NURSE PRACTITIONER

## 2022-07-14 PROCEDURE — 1101F PT FALLS ASSESS-DOCD LE1/YR: CPT | Performed by: NURSE PRACTITIONER

## 2022-07-14 RX ORDER — OMEPRAZOLE 20 MG/1
20 CAPSULE, DELAYED RELEASE ORAL DAILY
Qty: 90 CAPSULE | Refills: 1 | Status: SHIPPED | OUTPATIENT
Start: 2022-07-14

## 2022-07-14 RX ORDER — DICLOFENAC SODIUM 10 MG/G
GEL TOPICAL
Qty: 450 G | Refills: 1 | Status: SHIPPED | OUTPATIENT
Start: 2022-07-14

## 2022-07-14 NOTE — PROGRESS NOTES
Viviana Willett is a 80 y.o. male who presents to the office today for the following:    Chief Complaint   Patient presents with    Knee Pain    Hand Pain       Past Medical History:   Diagnosis Date    Benign prostatic hyperplasia 2017    Essential hypertension 2017    Full thickness rotator cuff tear 2017    Full thickness rotator cuff tear 2017    Glaucoma of left eye 2017    Glaucoma of left eye 2017    Left inguinal hernia 2017    Left inguinal hernia 2017    Stented coronary artery 3/24/2022       Past Surgical History:   Procedure Laterality Date    HX CATARACT REMOVAL      HX CHOLECYSTECTOMY      HX HEART CATHETERIZATION  2022    HX HERNIA REPAIR  2016    Dr. Josué Hankins    [address]  2020    Inguinal-Dr. Josué Hankins    HX ORTHOPAEDIC Left     Shoulder    HX ORTHOPAEDIC Right     Shoulder    HX ORTHOPAEDIC Right 2018    Hammer toe surgery         Family History   Problem Relation Age of Onset    Heart Disease Mother     Cancer Brother         Social History     Tobacco Use    Smoking status: Former Smoker     Packs/day: 0.50     Years: 10.00     Pack years: 5.00     Quit date:      Years since quittin.5    Smokeless tobacco: Never Used   Substance Use Topics    Alcohol use: Never    Drug use: Never        HPI  Patient here today with complaints of knee pain, leg swelling and hand pain with PMH of CAD, hypokalemia, hypertension, hyperlipidemia, neuropathy and BPH. States that in the past 3 weeks has started having left leg swelling. Has not had similar problem in past. Is also having left knee pain but this has been ongoing for several months. Was aggravated while doing cardiac rehab but did get a little better so did not pursue any further treatment. Pain at this time is mostly behind knee. No giving out or weakness. Also has pain in his right hand that comes and goes. Has been a problem for several years.  Uses tylenol prn for joint pain. No acute injury to hand. Current Outpatient Medications on File Prior to Visit   Medication Sig    Dutasteride-Tamsulosin 0.5-0.4 mg CM24 TAKE 1 CAPSULE BY MOUTH EVERY DAY. INDICATIONS: ENLARGED PROSTATE WITH URINATION PROBLEM    lisinopril-hydroCHLOROthiazide (PRINZIDE, ZESTORETIC) 10-12.5 mg per tablet TAKE 1 TABLET BY MOUTH EVERY DAY    nitroglycerin (NITROSTAT) 0.4 mg SL tablet     clopidogreL (PLAVIX) 75 mg tab Take 1 Tablet by mouth daily.  atorvastatin (LIPITOR) 40 mg tablet Take 1 Tablet by mouth daily.  metoprolol tartrate (LOPRESSOR) 25 mg tablet Take 0.5 Tablets by mouth two (2) times a day.  aspirin delayed-release 81 mg tablet Take 81 mg by mouth daily.  gabapentin (NEURONTIN) 100 mg capsule Take 2 Capsules by mouth daily. No current facility-administered medications on file prior to visit. Medications Ordered Today   Medications    omeprazole (PRILOSEC) 20 mg capsule     Sig: Take 1 Capsule by mouth daily. Dispense:  90 Capsule     Refill:  1    diclofenac (VOLTAREN) 1 % gel     Sig: USE 2 GRAMS AS DIRECTED TO THE AFFECTED AREA 4 TIMES A DAY     Dispense:  450 g     Refill:  1        Review of Systems   Constitutional: Negative. Respiratory: Negative. Cardiovascular: Positive for leg swelling. Negative for chest pain, palpitations, orthopnea and claudication. Gastrointestinal: Negative. Genitourinary: Negative. Musculoskeletal: Positive for joint pain and myalgias. Negative for back pain, falls and neck pain. Neurological: Positive for tingling. Negative for dizziness, sensory change, speech change, focal weakness, seizures, loss of consciousness and headaches.           Visit Vitals  /66 (BP 1 Location: Left upper arm, BP Patient Position: Sitting, BP Cuff Size: Adult)   Pulse 61   Temp 96.9 °F (36.1 °C) (Temporal)   Resp 18   Ht 6' (1.829 m)   Wt 144 lb 9.6 oz (65.6 kg)   SpO2 98%   BMI 19.61 kg/m²       Physical Exam  Vitals and nursing note reviewed. Constitutional:       Appearance: Normal appearance. Cardiovascular:      Rate and Rhythm: Normal rate and regular rhythm. Pulses: Normal pulses. Heart sounds: Normal heart sounds. Pulmonary:      Effort: Pulmonary effort is normal.      Breath sounds: Normal breath sounds. Abdominal:      General: Bowel sounds are normal.      Palpations: Abdomen is soft. Tenderness: There is no abdominal tenderness. Musculoskeletal:      Right hand: Tenderness present. No swelling. Normal range of motion. Hands:       Left knee: Normal range of motion. Left lower leg: Tenderness present. 2+ Edema present. Legs:    Skin:     General: Skin is warm and dry. Neurological:      Mental Status: He is alert. 1. Right hand pain      2. Acute pain of left knee    - XR KNEE LT MAX 2 VWS; Future  - diclofenac (VOLTAREN) 1 % gel; USE 2 GRAMS AS DIRECTED TO THE AFFECTED AREA 4 TIMES A DAY  Dispense: 450 g; Refill: 1    3. Left leg swelling    - DUPLEX LOWER EXT VENOUS LEFT; Future        New onset of unilateral left leg swelling x 3 weeks and does report some calf tenderness on exam  Check doppler lower extremity to rule out DVT- advised to notify provider if not contacted by scheduling in am.  Recommend compression stocking 25-35mmhg left leg and elevate prn  Is experiencing left knee pain which has been off and on but declined further eval previously  Now would like to get Xray and order provided  He will continue tylenol and topical voltaren prn  Declines further intervention for hand at this time  Will discuss further recommendations pending test results    We discussed the expected course, resolution and complications of the diagnosis(es) in detail. Medication risks, benefits, costs, interactions, and alternatives were discussed as indicated. I advised him to contact the office if his condition changes or fails to improve as anticipated.  Also advised if develops chest pain, sob or other worsening condition, go immediately to ED. He expressed understanding with the diagnosis(es) and plan. Follow-up and Dispositions    · Return in about 4 weeks (around 8/11/2022) for or sooner for worsening symptoms.

## 2022-07-14 NOTE — PROGRESS NOTES
Chief Complaint   Patient presents with    Knee Pain    Hand Pain     Both knees, left hand   He wants these addressed and not his medicare wellness       1. Have you been to the ER, urgent care clinic since your last visit? Hospitalized since your last visit? No    2. Have you seen or consulted any other health care providers outside of the [address] [place] [address] since your last visit? Include any pap smears or colon screening.  in chart

## 2022-07-15 ENCOUNTER — HOSPITAL ENCOUNTER (OUTPATIENT)
Dept: VASCULAR SURGERY | Age: 82
Discharge: HOME | End: 2022-07-15
Attending: NURSE PRACTITIONER
Payer: MEDICARE

## 2022-07-15 ENCOUNTER — HOSPITAL ENCOUNTER (OUTPATIENT)
Dept: GENERAL RADIOLOGY | Age: 82
Discharge: HOME | End: 2022-07-15
Attending: NURSE PRACTITIONER
Payer: MEDICARE

## 2022-07-15 ENCOUNTER — TELEPHONE (OUTPATIENT)
Dept: PRIMARY CARE CLINIC | Age: 82
End: 2022-07-15

## 2022-07-15 DIAGNOSIS — M79.89 LEFT LEG SWELLING: ICD-10-CM

## 2022-07-15 DIAGNOSIS — M25.562 ACUTE PAIN OF LEFT KNEE: ICD-10-CM

## 2022-07-15 PROCEDURE — 73560 X-RAY EXAM OF KNEE 1 OR 2: CPT

## 2022-07-15 PROCEDURE — 93971 EXTREMITY STUDY: CPT

## 2022-08-02 NOTE — PROGRESS NOTES
Please let patient know that xray shows the following:  IMPRESSION  Tricompartmental degenerative changes. If he is still having a lot of pain with knee, we could have him evaluated by orthopedic.

## 2022-08-31 ENCOUNTER — OFFICE VISIT (OUTPATIENT)
Dept: PRIMARY CARE CLINIC | Age: 82
End: 2022-08-31
Payer: MEDICARE

## 2022-08-31 VITALS
DIASTOLIC BLOOD PRESSURE: 62 MMHG | WEIGHT: 139.4 LBS | HEART RATE: 62 BPM | SYSTOLIC BLOOD PRESSURE: 127 MMHG | BODY MASS INDEX: 18.88 KG/M2 | TEMPERATURE: 97.4 F | RESPIRATION RATE: 18 BRPM | HEIGHT: 72 IN | OXYGEN SATURATION: 98 %

## 2022-08-31 DIAGNOSIS — I10 ESSENTIAL HYPERTENSION: Primary | ICD-10-CM

## 2022-08-31 DIAGNOSIS — M54.2 CHRONIC NECK AND BACK PAIN: ICD-10-CM

## 2022-08-31 DIAGNOSIS — Z71.89 ACP (ADVANCE CARE PLANNING): ICD-10-CM

## 2022-08-31 DIAGNOSIS — Z00.00 MEDICARE ANNUAL WELLNESS VISIT, SUBSEQUENT: ICD-10-CM

## 2022-08-31 DIAGNOSIS — M17.0 PRIMARY OSTEOARTHRITIS OF BOTH KNEES: ICD-10-CM

## 2022-08-31 DIAGNOSIS — K21.9 GASTROESOPHAGEAL REFLUX DISEASE WITHOUT ESOPHAGITIS: ICD-10-CM

## 2022-08-31 DIAGNOSIS — N40.1 BENIGN PROSTATIC HYPERPLASIA WITH LOWER URINARY TRACT SYMPTOMS, SYMPTOM DETAILS UNSPECIFIED: ICD-10-CM

## 2022-08-31 DIAGNOSIS — H40.9 GLAUCOMA, UNSPECIFIED GLAUCOMA TYPE, UNSPECIFIED LATERALITY: ICD-10-CM

## 2022-08-31 DIAGNOSIS — G62.9 NEUROPATHY: ICD-10-CM

## 2022-08-31 DIAGNOSIS — I25.10 CORONARY ARTERY DISEASE INVOLVING NATIVE HEART WITHOUT ANGINA PECTORIS, UNSPECIFIED VESSEL OR LESION TYPE: ICD-10-CM

## 2022-08-31 DIAGNOSIS — G89.29 CHRONIC NECK AND BACK PAIN: ICD-10-CM

## 2022-08-31 DIAGNOSIS — M54.9 CHRONIC NECK AND BACK PAIN: ICD-10-CM

## 2022-08-31 DIAGNOSIS — M19.041 PRIMARY OSTEOARTHRITIS OF RIGHT HAND: ICD-10-CM

## 2022-08-31 PROCEDURE — G8432 DEP SCR NOT DOC, RNG: HCPCS | Performed by: NURSE PRACTITIONER

## 2022-08-31 PROCEDURE — G8752 SYS BP LESS 140: HCPCS | Performed by: NURSE PRACTITIONER

## 2022-08-31 PROCEDURE — G8420 CALC BMI NORM PARAMETERS: HCPCS | Performed by: NURSE PRACTITIONER

## 2022-08-31 PROCEDURE — G8427 DOCREV CUR MEDS BY ELIG CLIN: HCPCS | Performed by: NURSE PRACTITIONER

## 2022-08-31 PROCEDURE — 1101F PT FALLS ASSESS-DOCD LE1/YR: CPT | Performed by: NURSE PRACTITIONER

## 2022-08-31 PROCEDURE — 99214 OFFICE O/P EST MOD 30 MIN: CPT | Performed by: NURSE PRACTITIONER

## 2022-08-31 PROCEDURE — 1123F ACP DISCUSS/DSCN MKR DOCD: CPT | Performed by: NURSE PRACTITIONER

## 2022-08-31 PROCEDURE — G8536 NO DOC ELDER MAL SCRN: HCPCS | Performed by: NURSE PRACTITIONER

## 2022-08-31 PROCEDURE — G8754 DIAS BP LESS 90: HCPCS | Performed by: NURSE PRACTITIONER

## 2022-08-31 PROCEDURE — G0439 PPPS, SUBSEQ VISIT: HCPCS | Performed by: NURSE PRACTITIONER

## 2022-08-31 NOTE — PROGRESS NOTES
Supa Schmitz is a 80 y.o. male who presents to the office today for the following:    Chief Complaint   Patient presents with    Annual Wellness Visit    Hypertension       Past Medical History:   Diagnosis Date    Benign prostatic hyperplasia 2017    Essential hypertension 2017    Full thickness rotator cuff tear 2017    Full thickness rotator cuff tear 2017    Glaucoma of left eye 2017    Glaucoma of left eye 2017    Left inguinal hernia 2017    Left inguinal hernia 2017    Stented coronary artery 3/24/2022       Past Surgical History:   Procedure Laterality Date    HX CATARACT REMOVAL      HX CHOLECYSTECTOMY      HX HEART CATHETERIZATION  2022    HX HERNIA REPAIR  2016    Dr. Shashi Justin HERNIA REPAIR  2020    Inguinal-Dr. Shashi Justin ORTHOPAEDIC Left     Shoulder    HX ORTHOPAEDIC Right     Shoulder    HX ORTHOPAEDIC Right 2018    Hammer toe surgery         Family History   Problem Relation Age of Onset    Heart Disease Mother     Cancer Brother         Social History     Tobacco Use    Smoking status: Former     Packs/day: 0.50     Years: 10.00     Pack years: 5.00     Types: Cigarettes     Quit date:      Years since quittin.6    Smokeless tobacco: Never   Substance Use Topics    Alcohol use: Never    Drug use: Never        HPI  Patient here     Current Outpatient Medications on File Prior to Visit   Medication Sig    omeprazole (PRILOSEC) 20 mg capsule Take 1 Capsule by mouth daily. diclofenac (VOLTAREN) 1 % gel USE 2 GRAMS AS DIRECTED TO THE AFFECTED AREA 4 TIMES A DAY    Dutasteride-Tamsulosin 0.5-0.4 mg CM24 TAKE 1 CAPSULE BY MOUTH EVERY DAY. INDICATIONS: ENLARGED PROSTATE WITH URINATION PROBLEM    lisinopril-hydroCHLOROthiazide (PRINZIDE, ZESTORETIC) 10-12.5 mg per tablet TAKE 1 TABLET BY MOUTH EVERY DAY    nitroglycerin (NITROSTAT) 0.4 mg SL tablet     clopidogreL (PLAVIX) 75 mg tab Take 1 Tablet by mouth daily.     atorvastatin (LIPITOR) 40 mg tablet Take 1 Tablet by mouth daily. metoprolol tartrate (LOPRESSOR) 25 mg tablet Take 0.5 Tablets by mouth two (2) times a day. aspirin delayed-release 81 mg tablet Take 81 mg by mouth daily. gabapentin (NEURONTIN) 100 mg capsule Take 2 Capsules by mouth daily. No current facility-administered medications on file prior to visit. No orders of the defined types were placed in this encounter. ROS    Visit Vitals  /62 (BP 1 Location: Left upper arm, BP Patient Position: Sitting, BP Cuff Size: Small adult)   Pulse 62   Temp 97.4 °F (36.3 °C) (Temporal)   Resp 18   Ht 6' (1.829 m)   Wt 139 lb 6.4 oz (63.2 kg)   SpO2 98%   BMI 18.91 kg/m²       Physical Exam  Vitals and nursing note reviewed. Constitutional:       Appearance: Normal appearance. HENT:      Right Ear: Tympanic membrane normal.      Left Ear: Tympanic membrane normal.      Mouth/Throat:      Mouth: Mucous membranes are moist.      Pharynx: Oropharynx is clear. Eyes:      Pupils: Pupils are equal, round, and reactive to light. Neck:      Vascular: No carotid bruit. Cardiovascular:      Rate and Rhythm: Normal rate and regular rhythm. Pulses: Normal pulses. Pulmonary:      Effort: Pulmonary effort is normal.      Breath sounds: Normal breath sounds. Abdominal:      General: Bowel sounds are normal.      Palpations: Abdomen is soft. Tenderness: There is no guarding. Musculoskeletal:         General: Normal range of motion. Right lower leg: No edema. Left lower leg: No edema. Lymphadenopathy:      Cervical: No cervical adenopathy. Skin:     General: Skin is warm and dry. Neurological:      Mental Status: He is alert and oriented to person, place, and time. Mental status is at baseline.       Gait: Gait normal.          1. Essential hypertension  Blood pressure is controlled and continue medications as directed  Checking fasting labs today  Encourage to monitor at home and notify provider if > 140/90 consistently   - AMB POC HEMOGLOBIN A1C  - CBC WITH AUTOMATED DIFF  - METABOLIC PANEL, COMPREHENSIVE  - URINALYSIS W/ RFLX MICROSCOPIC  - LIPID PANEL    2. Benign prostatic hyperplasia with lower urinary tract symptoms, symptom details unspecified  Lab Results   Component Value Date/Time    Prostate Specific Ag 1.2 08/11/2020 10:26 AM   Continues dutasteride-tamsulosin as directed and no symptoms reported    3. Gastroesophageal reflux disease without esophagitis  Stable and continues prilosec as directed    4. Chronic neck and back pain  Stable  Continues tylenol and topical voltaren prn    5. Glaucoma, unspecified glaucoma type, unspecified laterality  Continues care with ophthalmology     6. Primary osteoarthritis of right hand  He had requested to see orthopedic who has confirmed osteoarthritis of hand  Continues tylenol and topical voltaren prn    7. Primary osteoarthritis of both knees  Improved  Continues tylenol and topical voltaren prn    8. Coronary artery disease involving native heart without angina pectoris, unspecified vessel or lesion type  S/p PCI proximal LAD 2/18/22  Continues DAPT therapy x 12 mo then ASA indefinitely  Follows with Cardiology and reviewed last note from 5/17/22    9. Neuropathy  Continues gabapentin as directed       We discussed the expected course, resolution and complications of the diagnosis(es) in detail. Medication risks, benefits, costs, interactions, and alternatives were discussed as indicated. I advised him to contact the office if his condition worsens, changes or fails to improve as anticipated. He expressed understanding with the diagnosis(es) and plan. This is the Subsequent Medicare Annual Wellness Exam, performed 12 months or more after the Initial AWV or the last Subsequent AWV    I have reviewed the patient's medical history in detail and updated the computerized patient record.        Assessment/Plan   Education and counseling provided:  Are appropriate based on today's review and evaluation  End-of-Life planning (with patient's consent)  Pneumococcal Vaccine  Influenza Vaccine  Hepatitis B Vaccine  Screening Mammography  Colorectal cancer screening tests  Cardiovascular screening blood test  Screening for glaucoma  Diabetes screening test    1. Essential hypertension  -     AMB POC HEMOGLOBIN A1C  -     CBC WITH AUTOMATED DIFF  -     METABOLIC PANEL, COMPREHENSIVE  -     URINALYSIS W/ RFLX MICROSCOPIC  -     LIPID PANEL  2. Benign prostatic hyperplasia with lower urinary tract symptoms, symptom details unspecified  3. Gastroesophageal reflux disease without esophagitis  4. Chronic neck and back pain  5. Glaucoma, unspecified glaucoma type, unspecified laterality  6. Primary osteoarthritis of right hand  7. Primary osteoarthritis of both knees  8. Coronary artery disease involving native heart without angina pectoris, unspecified vessel or lesion type  9. Neuropathy  10. Medicare annual wellness visit, subsequent  11. ACP (advance care planning)       Depression Risk Factor Screening     3 most recent PHQ Screens 8/31/2022   Little interest or pleasure in doing things Not at all   Feeling down, depressed, irritable, or hopeless Not at all   Total Score PHQ 2 0       Alcohol & Drug Abuse Risk Screen    Do you average more than 1 drink per night or more than 7 drinks a week: No    In the past three months have you have had more than 4 drinks containing alcohol on one occasion: No          Functional Ability and Level of Safety    Hearing: Hearing is good. Activities of Daily Living: The home contains: handrails  Patient does total self care      Ambulation: with no difficulty     Fall Risk:  Fall Risk Assessment, last 12 mths 8/31/2022   Able to walk? Yes   Fall in past 12 months? 0   Do you feel unsteady?  0   Are you worried about falling 0      Abuse Screen:  Patient is not abused       Cognitive Screening    Has your family/caregiver stated any concerns about your memory: no     Cognitive Screening: Abnormal - Mini Cog Test    Health Maintenance Due     Health Maintenance Due   Topic Date Due    DTaP/Tdap/Td series (1 - Tdap) Never done    Pneumococcal 65+ years (2 - PPSV23 or PCV20) 12/23/2016    Shingrix Vaccine Age 50> (2 of 2) 11/13/2020    COVID-19 Vaccine (3 - Booster for Rodri Sax series) 04/10/2022     Reviewed recommended vaccines for age and he will complete at local pharmacy if desires  Patient Care Team   Patient Care Team:  Lauro Lesch, NP as PCP - General (Physician Assistant)  Lauro Lesch, NP as PCP - [facility] Empaneled Provider    History     Patient Active Problem List   Diagnosis Code    Benign prostatic hyperplasia N40.0    Essential hypertension I10    Right inguinal hernia K40.90    Chronic neck and back pain M54.2, M54.9, G89.29    Stented coronary artery Z95.5    Degeneration of cervical intervertebral disc M50.30     Past Medical History:   Diagnosis Date    Benign prostatic hyperplasia 6/5/2017    Essential hypertension 6/5/2017    Full thickness rotator cuff tear 6/5/2017    Full thickness rotator cuff tear 6/5/2017    Glaucoma of left eye 12/29/2017    Glaucoma of left eye 12/29/2017    Left inguinal hernia 6/5/2017    Left inguinal hernia 6/5/2017    Stented coronary artery 3/24/2022      Past Surgical History:   Procedure Laterality Date    HX CATARACT REMOVAL      HX CHOLECYSTECTOMY      HX HEART CATHETERIZATION  02/2022    HX HERNIA REPAIR  05/31/2016    Dr. Marek Cobian HERNIA REPAIR  11/30/2020    Inguinal-Dr. Leeanna Duran    HX ORTHOPAEDIC Left     Shoulder    HX ORTHOPAEDIC Right     Shoulder    HX ORTHOPAEDIC Right 03/22/2018    Hammer toe surgery      Current Outpatient Medications   Medication Sig Dispense Refill    omeprazole (PRILOSEC) 20 mg capsule Take 1 Capsule by mouth daily.  90 Capsule 1    diclofenac (VOLTAREN) 1 % gel USE 2 GRAMS AS DIRECTED TO THE AFFECTED AREA 4 TIMES A  g 1 Dutasteride-Tamsulosin 0.5-0.4 mg CM24 TAKE 1 CAPSULE BY MOUTH EVERY DAY. INDICATIONS: ENLARGED PROSTATE WITH URINATION PROBLEM 90 Capsule 0    lisinopril-hydroCHLOROthiazide (PRINZIDE, ZESTORETIC) 10-12.5 mg per tablet TAKE 1 TABLET BY MOUTH EVERY DAY 90 Tablet 0    nitroglycerin (NITROSTAT) 0.4 mg SL tablet       clopidogreL (PLAVIX) 75 mg tab Take 1 Tablet by mouth daily. atorvastatin (LIPITOR) 40 mg tablet Take 1 Tablet by mouth daily. metoprolol tartrate (LOPRESSOR) 25 mg tablet Take 0.5 Tablets by mouth two (2) times a day. aspirin delayed-release 81 mg tablet Take 81 mg by mouth daily. gabapentin (NEURONTIN) 100 mg capsule Take 2 Capsules by mouth daily.        No Known Allergies    Family History   Problem Relation Age of Onset    Heart Disease Mother     Cancer Brother      Social History     Tobacco Use    Smoking status: Former     Packs/day: 0.50     Years: 10.00     Pack years: 5.00     Types: Cigarettes     Quit date:      Years since quittin.6    Smokeless tobacco: Never   Substance Use Topics    Alcohol use: Never         Cliff Swenson NP

## 2022-08-31 NOTE — PROGRESS NOTES
Chief Complaint   Patient presents with    Annual Wellness Visit     Medicare wellness     1. \"Have you been to the ER, urgent care clinic since your last visit? Hospitalized since your last visit? \" No    2. \"Have you seen or consulted any other health care providers outside of the [address] [place] [address] since your last visit? \"  In chart       3. For patients aged 39-70: Has the patient had a colonoscopy / FIT/ Cologuard? NA - based on age      If the patient is female:    4. For patients aged 41-77: Has the patient had a mammogram within the past 2 years? NA - based on age or sex      11. For patients aged 21-65: Has the patient had a pap smear?  NA - based on age or sex

## 2022-09-01 DIAGNOSIS — I10 ESSENTIAL HYPERTENSION: ICD-10-CM

## 2022-09-01 LAB
ALBUMIN SERPL-MCNC: 4.2 G/DL (ref 3.6–4.6)
ALBUMIN/GLOB SERPL: 1.8 {RATIO} (ref 1.2–2.2)
ALP SERPL-CCNC: 86 IU/L (ref 44–121)
ALT SERPL-CCNC: 15 IU/L (ref 0–44)
APPEARANCE UR: CLEAR
AST SERPL-CCNC: 21 IU/L (ref 0–40)
BASOPHILS # BLD AUTO: 0 X10E3/UL (ref 0–0.2)
BASOPHILS NFR BLD AUTO: 1 %
BILIRUB SERPL-MCNC: 0.5 MG/DL (ref 0–1.2)
BILIRUB UR QL STRIP: NEGATIVE
BUN SERPL-MCNC: 16 MG/DL (ref 8–27)
BUN/CREAT SERPL: 15 (ref 10–24)
CALCIUM SERPL-MCNC: 9.7 MG/DL (ref 8.6–10.2)
CHLORIDE SERPL-SCNC: 102 MMOL/L (ref 96–106)
CHOLEST SERPL-MCNC: 97 MG/DL (ref 100–199)
CO2 SERPL-SCNC: 28 MMOL/L (ref 20–29)
COLOR UR: YELLOW
CREAT SERPL-MCNC: 1.06 MG/DL (ref 0.76–1.27)
EGFR: 70 ML/MIN/1.73
EOSINOPHIL # BLD AUTO: 0.1 X10E3/UL (ref 0–0.4)
EOSINOPHIL NFR BLD AUTO: 3 %
ERYTHROCYTE [DISTWIDTH] IN BLOOD BY AUTOMATED COUNT: 12.2 % (ref 11.6–15.4)
GLOBULIN SER CALC-MCNC: 2.4 G/DL (ref 1.5–4.5)
GLUCOSE SERPL-MCNC: 98 MG/DL (ref 65–99)
GLUCOSE UR QL STRIP: NEGATIVE
HCT VFR BLD AUTO: 40.8 % (ref 37.5–51)
HDLC SERPL-MCNC: 51 MG/DL
HGB BLD-MCNC: 14 G/DL (ref 13–17.7)
HGB UR QL STRIP: NEGATIVE
IMM GRANULOCYTES # BLD AUTO: 0 X10E3/UL (ref 0–0.1)
IMM GRANULOCYTES NFR BLD AUTO: 0 %
KETONES UR QL STRIP: NEGATIVE
LDLC SERPL CALC-MCNC: 32 MG/DL (ref 0–99)
LEUKOCYTE ESTERASE UR QL STRIP: NEGATIVE
LYMPHOCYTES # BLD AUTO: 0.7 X10E3/UL (ref 0.7–3.1)
LYMPHOCYTES NFR BLD AUTO: 21 %
MCH RBC QN AUTO: 31.6 PG (ref 26.6–33)
MCHC RBC AUTO-ENTMCNC: 34.3 G/DL (ref 31.5–35.7)
MCV RBC AUTO: 92 FL (ref 79–97)
MICRO URNS: NORMAL
MONOCYTES # BLD AUTO: 0.4 X10E3/UL (ref 0.1–0.9)
MONOCYTES NFR BLD AUTO: 11 %
NEUTROPHILS # BLD AUTO: 2.2 X10E3/UL (ref 1.4–7)
NEUTROPHILS NFR BLD AUTO: 64 %
NITRITE UR QL STRIP: NEGATIVE
PH UR STRIP: 7 [PH] (ref 5–7.5)
PLATELET # BLD AUTO: 159 X10E3/UL (ref 150–450)
POTASSIUM SERPL-SCNC: 4.1 MMOL/L (ref 3.5–5.2)
PROT SERPL-MCNC: 6.6 G/DL (ref 6–8.5)
PROT UR QL STRIP: NEGATIVE
RBC # BLD AUTO: 4.43 X10E6/UL (ref 4.14–5.8)
SODIUM SERPL-SCNC: 141 MMOL/L (ref 134–144)
SP GR UR STRIP: 1.01 (ref 1–1.03)
TRIGL SERPL-MCNC: 60 MG/DL (ref 0–149)
UROBILINOGEN UR STRIP-MCNC: 0.2 MG/DL (ref 0.2–1)
VLDLC SERPL CALC-MCNC: 14 MG/DL (ref 5–40)
WBC # BLD AUTO: 3.4 X10E3/UL (ref 3.4–10.8)

## 2022-09-01 RX ORDER — LISINOPRIL AND HYDROCHLOROTHIAZIDE 10; 12.5 MG/1; MG/1
TABLET ORAL
Qty: 90 TABLET | Refills: 0 | Status: SHIPPED | OUTPATIENT
Start: 2022-09-01

## 2022-10-09 DIAGNOSIS — N40.1 BENIGN PROSTATIC HYPERPLASIA WITH LOWER URINARY TRACT SYMPTOMS: ICD-10-CM

## 2022-10-09 RX ORDER — DUTASTERIDE AND TAMSULOSIN HYDROCHLORIDE CAPSULES .5; .4 MG/1; MG/1
CAPSULE ORAL
Qty: 90 CAPSULE | Refills: 0 | Status: SHIPPED | OUTPATIENT
Start: 2022-10-09 | End: 2022-10-28 | Stop reason: SDUPTHER

## 2022-10-24 ENCOUNTER — TELEPHONE (OUTPATIENT)
Dept: PRIMARY CARE CLINIC | Age: 82
End: 2022-10-24

## 2022-10-24 NOTE — TELEPHONE ENCOUNTER
Pt needs you to call him to see if there is a substitute for his medicine for enlarged prostrate. Pt unable to get meds from [facility]--they been promising him for a week. He has one pill left for Tues. Please call him at [phone]

## 2022-10-25 NOTE — TELEPHONE ENCOUNTER
Spoke with Ana at [address] [place] [address] and he stated he would have RX filled this afternoon for patient. I lvm informing patient of this and he is to call office if he has any problems getting his medicine.

## 2022-10-25 NOTE — TELEPHONE ENCOUNTER
Called [facility] and the Pharmacy will not open until 9 am so I called the patient and explained the situation to him. He stated understanding. I informed him that if [facility] could not fill his medication that he stated really helps not only with his prostate but with urination, I will ask Ms. Bruce about sending a RX to [facility] for him. He stated that would be fine.

## 2022-10-27 ENCOUNTER — CLINICAL SUPPORT (OUTPATIENT)
Dept: PRIMARY CARE CLINIC | Age: 82
End: 2022-10-27
Payer: MEDICARE

## 2022-10-27 DIAGNOSIS — Z23 NEEDS FLU SHOT: Primary | ICD-10-CM

## 2022-10-27 PROCEDURE — G0008 ADMIN INFLUENZA VIRUS VAC: HCPCS | Performed by: NURSE PRACTITIONER

## 2022-10-27 PROCEDURE — 90694 VACC AIIV4 NO PRSRV 0.5ML IM: CPT | Performed by: NURSE PRACTITIONER

## 2022-10-28 ENCOUNTER — TELEPHONE (OUTPATIENT)
Dept: PRIMARY CARE CLINIC | Age: 82
End: 2022-10-28

## 2022-10-28 DIAGNOSIS — N40.1 BENIGN PROSTATIC HYPERPLASIA WITH LOWER URINARY TRACT SYMPTOMS: ICD-10-CM

## 2022-10-28 RX ORDER — DUTASTERIDE AND TAMSULOSIN HYDROCHLORIDE CAPSULES .5; .4 MG/1; MG/1
1 CAPSULE ORAL DAILY
Qty: 90 CAPSULE | Refills: 1 | Status: SHIPPED | OUTPATIENT
Start: 2022-10-28

## 2022-11-21 DIAGNOSIS — I10 ESSENTIAL HYPERTENSION: ICD-10-CM

## 2022-11-21 RX ORDER — LISINOPRIL AND HYDROCHLOROTHIAZIDE 10; 12.5 MG/1; MG/1
TABLET ORAL
Qty: 90 TABLET | Refills: 0 | Status: SHIPPED | OUTPATIENT
Start: 2022-11-21

## 2023-03-28 ENCOUNTER — NURSE TRIAGE (OUTPATIENT)
Dept: OTHER | Facility: CLINIC | Age: 83
End: 2023-03-28

## 2023-03-29 ENCOUNTER — OFFICE VISIT (OUTPATIENT)
Dept: PRIMARY CARE CLINIC | Age: 83
End: 2023-03-29
Payer: MEDICARE

## 2023-03-29 ENCOUNTER — TRANSCRIBE ORDER (OUTPATIENT)
Dept: SCHEDULING | Age: 83
End: 2023-03-29

## 2023-03-29 VITALS
HEIGHT: 72 IN | DIASTOLIC BLOOD PRESSURE: 78 MMHG | BODY MASS INDEX: 20.64 KG/M2 | WEIGHT: 152.4 LBS | TEMPERATURE: 97.1 F | OXYGEN SATURATION: 98 % | HEART RATE: 61 BPM | RESPIRATION RATE: 18 BRPM | SYSTOLIC BLOOD PRESSURE: 139 MMHG

## 2023-03-29 DIAGNOSIS — T16.2XXA FOREIGN BODY OF LEFT EAR, INITIAL ENCOUNTER: ICD-10-CM

## 2023-03-29 DIAGNOSIS — M79.673 PAIN IN UNSPECIFIED FOOT: Primary | ICD-10-CM

## 2023-03-29 DIAGNOSIS — H92.22 BLEEDING FROM EAR, LEFT: ICD-10-CM

## 2023-03-29 DIAGNOSIS — H92.02 OTALGIA OF LEFT EAR: Primary | ICD-10-CM

## 2023-03-29 DIAGNOSIS — M79.675 PAIN IN TOE OF LEFT FOOT: ICD-10-CM

## 2023-03-29 DIAGNOSIS — M79.674 PAIN IN TOE OF RIGHT FOOT: ICD-10-CM

## 2023-03-29 DIAGNOSIS — Z92.29 HX OF LONG TERM USE OF BLOOD THINNERS: ICD-10-CM

## 2023-03-29 DIAGNOSIS — G47.62 SLEEP RELATED LEG CRAMPS: ICD-10-CM

## 2023-03-29 PROCEDURE — 3078F DIAST BP <80 MM HG: CPT | Performed by: NURSE PRACTITIONER

## 2023-03-29 PROCEDURE — G8420 CALC BMI NORM PARAMETERS: HCPCS | Performed by: NURSE PRACTITIONER

## 2023-03-29 PROCEDURE — 69200 CLEAR OUTER EAR CANAL: CPT | Performed by: NURSE PRACTITIONER

## 2023-03-29 PROCEDURE — 1101F PT FALLS ASSESS-DOCD LE1/YR: CPT | Performed by: NURSE PRACTITIONER

## 2023-03-29 PROCEDURE — 99214 OFFICE O/P EST MOD 30 MIN: CPT | Performed by: NURSE PRACTITIONER

## 2023-03-29 PROCEDURE — 1123F ACP DISCUSS/DSCN MKR DOCD: CPT | Performed by: NURSE PRACTITIONER

## 2023-03-29 PROCEDURE — G8432 DEP SCR NOT DOC, RNG: HCPCS | Performed by: NURSE PRACTITIONER

## 2023-03-29 PROCEDURE — G8536 NO DOC ELDER MAL SCRN: HCPCS | Performed by: NURSE PRACTITIONER

## 2023-03-29 PROCEDURE — G8427 DOCREV CUR MEDS BY ELIG CLIN: HCPCS | Performed by: NURSE PRACTITIONER

## 2023-03-29 PROCEDURE — 3075F SYST BP GE 130 - 139MM HG: CPT | Performed by: NURSE PRACTITIONER

## 2023-03-29 RX ORDER — LATANOPROST 50 UG/ML
SOLUTION/ DROPS OPHTHALMIC
COMMUNITY
Start: 2023-03-22

## 2023-03-29 NOTE — PROGRESS NOTES
Chief Complaint   Patient presents with    Ear Pain     For about a week off and on     1. Have you been to the ER, urgent care clinic since your last visit? Hospitalized since your last visit? No    2. Have you seen or consulted any other health care providers outside of the [address] [place] [address] since your last visit? Include any pap smears or colon screening.  No

## 2023-03-29 NOTE — PROGRESS NOTES
Cindi Douglas is a 80 y.o. male who presents to the office today for the following:    Chief Complaint   Patient presents with    Ear Pain       Past Medical History:   Diagnosis Date    Benign prostatic hyperplasia 2017    Essential hypertension 2017    Full thickness rotator cuff tear 2017    Full thickness rotator cuff tear 2017    Glaucoma of left eye 2017    Glaucoma of left eye 2017    Left inguinal hernia 2017    Left inguinal hernia 2017    Stented coronary artery 3/24/2022       Past Surgical History:   Procedure Laterality Date    HX CATARACT REMOVAL      HX CHOLECYSTECTOMY      HX HEART CATHETERIZATION  2022    HX HERNIA REPAIR  2016    Dr. Evi Chaparro HERNIA REPAIR  2020    Inguinal-Dr. Evi Chaparro ORTHOPAEDIC Left     Shoulder    HX ORTHOPAEDIC Right     Shoulder    HX ORTHOPAEDIC Right 2018    Hammer toe surgery         Family History   Problem Relation Age of Onset    Heart Disease Mother     Cancer Brother         Social History     Tobacco Use    Smoking status: Former     Packs/day: 0.50     Years: 10.00     Pack years: 5.00     Types: Cigarettes     Quit date:      Years since quittin.2    Smokeless tobacco: Never   Substance Use Topics    Alcohol use: Never    Drug use: Never        HPI  Patient with PMH of CAD, hypokalemia, hypertension, hyperlipidemia, neuropathy and BPH who presents with complaint of 1 week of left ear and face pain. States that he tried cleaning his ear with cutip but seemed to irritate more so left alone. Symptoms have been better today but still some feeling of fullness. No drainage or fever. Denies other associated symptoms such as congestion, sore throat, lymph node swelling or cough.     Current Outpatient Medications on File Prior to Visit   Medication Sig    latanoprost (XALATAN) 0.005 % ophthalmic solution INSTILL 1 DROP INTO LEFT EYE AT BEDTIME    lisinopril-hydroCHLOROthiazide (PRINZIDE, ZESTORETIC) 10-12.5 mg per tablet TAKE 1 TABLET BY MOUTH EVERY DAY    Dutasteride-Tamsulosin 0.5-0.4 mg CM24 Take 1 Capsule by mouth daily. Indications: enlarged prostate with urination problem    omeprazole (PRILOSEC) 20 mg capsule Take 1 Capsule by mouth daily. diclofenac (VOLTAREN) 1 % gel USE 2 GRAMS AS DIRECTED TO THE AFFECTED AREA 4 TIMES A DAY    nitroglycerin (NITROSTAT) 0.4 mg SL tablet     clopidogreL (PLAVIX) 75 mg tab Take 1 Tablet by mouth daily. atorvastatin (LIPITOR) 40 mg tablet Take 1 Tablet by mouth daily. metoprolol tartrate (LOPRESSOR) 25 mg tablet Take 0.5 Tablets by mouth two (2) times a day. aspirin delayed-release 81 mg tablet Take 81 mg by mouth daily. gabapentin (NEURONTIN) 100 mg capsule Take 2 Capsules by mouth daily. No current facility-administered medications on file prior to visit. No orders of the defined types were placed in this encounter. Review of Systems   Constitutional:  Negative for chills, fever, malaise/fatigue and weight loss. HENT:  Positive for ear pain (left) and hearing loss. Negative for congestion, sinus pain and sore throat. Respiratory: Negative. Cardiovascular: Negative. Gastrointestinal: Negative. Genitourinary: Negative. Musculoskeletal:  Positive for myalgias. Neurological:  Positive for tingling. Negative for dizziness, seizures, weakness and headaches. Visit Vitals  /78   Pulse 61   Temp 97.1 °F (36.2 °C) (Temporal)   Resp 18   Ht 6' (1.829 m)   Wt 152 lb 6.4 oz (69.1 kg)   SpO2 98%   BMI 20.67 kg/m²       Physical Exam  Vitals and nursing note reviewed. Constitutional:       Appearance: Normal appearance. HENT:      Right Ear: Tympanic membrane normal.      Left Ear: There is impacted cerumen. Ears:      Comments: TM not visualized     Nose: No congestion. Mouth/Throat:      Pharynx: No oropharyngeal exudate or posterior oropharyngeal erythema.    Cardiovascular:      Rate and Rhythm: Normal rate. Pulmonary:      Effort: Pulmonary effort is normal.      Breath sounds: Normal breath sounds. Abdominal:      General: Bowel sounds are normal.      Palpations: Abdomen is soft. Tenderness: There is no abdominal tenderness. Musculoskeletal:         General: Normal range of motion. Lymphadenopathy:      Cervical: No cervical adenopathy. Skin:     General: Skin is warm and dry. Neurological:      Mental Status: He is alert and oriented to person, place, and time. Mental status is at baseline. 1. Otalgia of left ear      2. Foreign body of left ear, initial encounter    - REFERRAL TO ENT-OTOLARYNGOLOGY    3. Bleeding from ear, left    - REFERRAL TO ENT-OTOLARYNGOLOGY    4. Hx of long term use of blood thinners      Procedure note: Ear flushing  Performed by Angelina Alpers, NP , Justice Goodman LPN. Cerumen impaction noted and irrigation was indicated. Risks and benefits of procedure explained including risk of infection, bleeding or perforation of TM. Verbal consent from patient was obtained  Performed cerumen removal by irrigation w/ H2O and curette. Foreign body with cutip was noted and removed. Bleeding to canal noted post removal of foreign body. TM partially visualized and appeared normal but full visualization is impaired due to bleeding in canal. He reports hearing restored and no pain currently. Given bleeding is stopping, he will monitor at home and have scheduled urgent ENT appointment to re-evaluate given he is on plavix and asa for CAD. Will hold plavix until seen by ENT on 3/30/23 at 9am with Dr. Devi Ibrahim. Informed to return to ETC or seek emergency care  if has new or worsening symptoms such as fever,  vomiting, decreased PO or persistent bleeding. Expressed understanding of and agreement with plan and all questions answered. The procedure was tolerated well. Avoid use of cutips or other objects in ear.       Medication risks/benefits/costs/interactions/alternatives discussed with patient. Advised patient to call back or return to office if symptoms worsen/change/persist. If patient cannot reach us or should anything more severe/urgent arise he should proceed directly to the nearest emergency department. Discussed expected course/resolution/complications of diagnosis in detail with patient. Patient given a written after visit summary which includes her diagnoses, current medications and vitals. Patient expressed understanding with the diagnosis and plan. Follow-up and Dispositions    Return if symptoms worsen or fail to improve.

## 2023-03-30 ENCOUNTER — OFFICE VISIT (OUTPATIENT)
Dept: ENT CLINIC | Age: 83
End: 2023-03-30
Payer: MEDICARE

## 2023-03-30 VITALS
OXYGEN SATURATION: 98 % | BODY MASS INDEX: 20.59 KG/M2 | HEART RATE: 96 BPM | SYSTOLIC BLOOD PRESSURE: 136 MMHG | RESPIRATION RATE: 18 BRPM | HEIGHT: 72 IN | WEIGHT: 152 LBS | DIASTOLIC BLOOD PRESSURE: 78 MMHG

## 2023-03-30 DIAGNOSIS — S09.91XA INJURY OF EAR CANAL, INITIAL ENCOUNTER: ICD-10-CM

## 2023-03-30 DIAGNOSIS — T16.1XXA FOREIGN BODY OF RIGHT EAR, INITIAL ENCOUNTER: ICD-10-CM

## 2023-03-30 DIAGNOSIS — H92.22 BLEEDING FROM LEFT EAR: Primary | ICD-10-CM

## 2023-03-30 DIAGNOSIS — Z79.01 ANTICOAGULATED: ICD-10-CM

## 2023-03-30 PROCEDURE — 3075F SYST BP GE 130 - 139MM HG: CPT | Performed by: OTOLARYNGOLOGY

## 2023-03-30 PROCEDURE — G8432 DEP SCR NOT DOC, RNG: HCPCS | Performed by: OTOLARYNGOLOGY

## 2023-03-30 PROCEDURE — 1123F ACP DISCUSS/DSCN MKR DOCD: CPT | Performed by: OTOLARYNGOLOGY

## 2023-03-30 PROCEDURE — G8427 DOCREV CUR MEDS BY ELIG CLIN: HCPCS | Performed by: OTOLARYNGOLOGY

## 2023-03-30 PROCEDURE — 99203 OFFICE O/P NEW LOW 30 MIN: CPT | Performed by: OTOLARYNGOLOGY

## 2023-03-30 PROCEDURE — 69200 CLEAR OUTER EAR CANAL: CPT | Performed by: OTOLARYNGOLOGY

## 2023-03-30 PROCEDURE — G8420 CALC BMI NORM PARAMETERS: HCPCS | Performed by: OTOLARYNGOLOGY

## 2023-03-30 PROCEDURE — 3078F DIAST BP <80 MM HG: CPT | Performed by: OTOLARYNGOLOGY

## 2023-03-30 PROCEDURE — 1101F PT FALLS ASSESS-DOCD LE1/YR: CPT | Performed by: OTOLARYNGOLOGY

## 2023-03-30 PROCEDURE — G8536 NO DOC ELDER MAL SCRN: HCPCS | Performed by: OTOLARYNGOLOGY

## 2023-03-30 NOTE — PROGRESS NOTES
Chief Complaint   Patient presents with    New Patient    Ear Pain     cleaning his ear then about 1 wk later there was pain.  Pcp checked and removed part of a qtip but saw some bleeding        Visit Vitals  /78 (BP 1 Location: Left upper arm, BP Patient Position: Sitting, BP Cuff Size: Adult)   Pulse 96   Resp 18   Ht 6' (1.829 m)   Wt 152 lb (68.9 kg)   SpO2 98%   BMI 20.61 kg/m²

## 2023-03-30 NOTE — PROGRESS NOTES
Otolaryngology-Head and Neck Surgery  New Patient Visit     Patient: Kingston Nissen  YOB: 1940  MRN: [id number]  Date of Service: 3/30/2023    Chief Complaint:   Chief Complaint   Patient presents with    New Patient    Ear Pain     cleaning his ear then about 1 wk later there was pain.  Pcp checked and removed part of a qtip but saw some bleeding          History of Present Illness: Kingston Nissen is a 80y.o. year old male who presents today for discussion of his ears    Developed left otalgia, tried using q tip to clear  Seen by PCP yesterday and noted to have left ear foreign body (cotton)  Was able to be removed however he had some bleeding the ear canal -- he was therefore referred to use today    Denies pain  Mar Makos further bleeding    No prior ear surgeries       Past Medical History:  Past Medical History:   Diagnosis Date    Benign prostatic hyperplasia 6/5/2017    Essential hypertension 6/5/2017    Full thickness rotator cuff tear 6/5/2017    Full thickness rotator cuff tear 6/5/2017    Glaucoma of left eye 12/29/2017    Glaucoma of left eye 12/29/2017    Left inguinal hernia 6/5/2017    Left inguinal hernia 6/5/2017    Stented coronary artery 3/24/2022       Past Surgical History:   Past Surgical History:   Procedure Laterality Date    HX CATARACT REMOVAL      HX CHOLECYSTECTOMY      HX HEART CATHETERIZATION  02/2022    HX HERNIA REPAIR  05/31/2016    Dr. Elena Croft HERNIA REPAIR  11/30/2020    Inguinal-Dr. Elena Croft ORTHOPAEDIC Left     Shoulder    HX ORTHOPAEDIC Right     Shoulder    HX ORTHOPAEDIC Right 03/22/2018    Hammer toe surgery        Medications:   Current Outpatient Medications   Medication Instructions    aspirin delayed-release 81 mg, Oral, DAILY    atorvastatin (LIPITOR) 40 mg tablet 1 Tablet, Oral, DAILY    clopidogreL (PLAVIX) 75 mg tab 1 Tablet, Oral, DAILY    diclofenac (VOLTAREN) 1 % gel USE 2 GRAMS AS DIRECTED TO THE AFFECTED AREA 4 TIMES A DAY Dutasteride-Tamsulosin 0.5-0.4 mg CM24 1 Capsule, Oral, DAILY    gabapentin (NEURONTIN) 100 mg capsule 2 Capsules, Oral, DAILY    latanoprost (XALATAN) 0.005 % ophthalmic solution INSTILL 1 DROP INTO LEFT EYE AT BEDTIME    lisinopril-hydroCHLOROthiazide (PRINZIDE, ZESTORETIC) 10-12.5 mg per tablet TAKE 1 TABLET BY MOUTH EVERY DAY    metoprolol tartrate (LOPRESSOR) 25 mg tablet 0.5 Tablets, Oral, 2 TIMES DAILY    nitroglycerin (NITROSTAT) 0.4 mg SL tablet No dose, route, or frequency recorded. omeprazole (PRILOSEC) 20 mg, Oral, DAILY       Allergies:   No Known Allergies    Social History:   Social History     Tobacco Use    Smoking status: Former     Packs/day: 0.50     Years: 10.00     Pack years: 5.00     Types: Cigarettes     Quit date:      Years since quittin.2    Smokeless tobacco: Never   Substance Use Topics    Alcohol use: Never    Drug use: Never        Family History:  Family History   Problem Relation Age of Onset    Heart Disease Mother     Cancer Brother        Review of Systems:    Consitutional: denies fever, excessive weight gain or loss. Eyes: denies diplopia, eye pain. Integumentary: denies new concerning skin lesions. Ears, Nose, Mouth, Throat: denies except as per HPI.   Endocrine: denies hot or cold intolerance, increased thirst.  Respiratory: denies cough, hemoptysis, wheezing  Gastrointestinal: denies trouble swallowing, nausea, emesis, regurgitation  Musculoskeletal: denies muscle weakness or wasting  Cardiovascular: denies chest pain, shortness of breath  Neurologic: denies seizures, numbness or tingling, syncope  Hematologic: denies easy bleeding or bruising    Physical Examination:   Vitals:    23 0907   BP: 136/78   BP 1 Location: Left upper arm   BP Patient Position: Sitting   BP Cuff Size: Adult   Pulse: 96   Resp: 18   Height: 6' (1.829 m)   Weight: 152 lb (68.9 kg)   SpO2: 98%        General: Comfortable, pleasant, appears stated age  Voice: Strong, speaking in full sentences, no stridor    Face: No masses or lesions, facial strength symmetric   Ears: External ears unremarkable. Right ear with cotton, removed to reveal clear and intact TM. Left ear with avulsion along anterior canal wall. Otherwise TM clear and intact. Nose: External nose unremarkable. Dorsum midline. Anterior rhinoscopy demonstrates no lesions. Septum midline. Turbinates without hypertrophy. Oral Cavity / Oropharynx: No trismus. Mucosa pink and moist. No lesions. Tongue is midline and mobile. Palate elevates symmetrically. Uvula midline. Tonsils unremarkable. Base of tongue soft. Floor of mouth soft. Neck: Supple. No adenopathy. Thyroid unremarkable. Palpable laryngeal landmarks. Full neck range of motion   Neurologic: CN II - XI intact. Normal gait    PROCEDURE: BILATERAL MICROSCOPY WITH REMOVAL RIGHT EAR FOREIGN BODY    Using the microscope, both ears were examined. An alligator was used to remove the right cotton ball. Following removal, his ear was inspected, revealing a clear and intact TM bilaterally. Patient tolerated the procedure well     Assessment and Plan:   Left ear canal avulsion  Right ear canal foreign body  Anticoagulation  Left ear bleeding  - Left ear canal avulsion - will heal ok  - Avoid manipulation of ears and dry ear precautions  - Add floxin to left ear x 1 week  - Right ear actually also has cotton foreign body which was removed  - Follow up in 3 weeks for recheck  -Hitesh Travis to resume plavix       The patient was instructed to return to clinic if no improvement or progression of symptoms. Signs to watch out for reviewed.       MD Rachel Mcmillan [facility]  [address] [place] [address]  Office Phone: [phone]

## 2023-03-31 ENCOUNTER — TELEPHONE (OUTPATIENT)
Dept: ENT CLINIC | Age: 83
End: 2023-03-31

## 2023-03-31 RX ORDER — OFLOXACIN 3 MG/ML
5 SOLUTION AURICULAR (OTIC) 2 TIMES DAILY
Qty: 5 ML | Refills: 0 | Status: SHIPPED | OUTPATIENT
Start: 2023-03-31 | End: 2023-04-07

## 2023-04-03 ENCOUNTER — HOSPITAL ENCOUNTER (OUTPATIENT)
Dept: VASCULAR SURGERY | Age: 83
End: 2023-04-03
Attending: PODIATRIST
Payer: MEDICARE

## 2023-04-03 DIAGNOSIS — M79.675 PAIN IN TOE OF LEFT FOOT: ICD-10-CM

## 2023-04-03 DIAGNOSIS — M79.673 PAIN IN UNSPECIFIED FOOT: ICD-10-CM

## 2023-04-03 DIAGNOSIS — M79.674 PAIN IN TOE OF RIGHT FOOT: ICD-10-CM

## 2023-04-03 DIAGNOSIS — G47.62 SLEEP RELATED LEG CRAMPS: ICD-10-CM

## 2023-04-03 PROCEDURE — 93923 UPR/LXTR ART STDY 3+ LVLS: CPT

## 2023-04-05 LAB
LEFT ABI: 1.15
LEFT ARM BP: 129 MMHG
LEFT CALF PRESSURE: 144 MMHG
LEFT HIGH THIGH PRESSURE: 162 MMHG
LEFT LOW THIGH PRESSURE: 164 MMHG
LEFT POSTERIOR TIBIAL: 148 MMHG
LEFT TBI: 0.58
LEFT TOE PRESSURE: 75 MMHG
RIGHT ABI: 1.12
RIGHT ARM BP: 123 MMHG
RIGHT CALF PRESSURE: 158 MMHG
RIGHT HIGH THIGH PRESSURE: 167 MMHG
RIGHT LOW THIGH PRESSURE: 168 MMHG
RIGHT POSTERIOR TIBIAL: 138 MMHG
RIGHT TBI: 0.6
RIGHT TOE PRESSURE: 78 MMHG
VAS LEFT ANKLE BP: 148 MMHG
VAS LEFT DORSALIS PEDIS BP: 138 MMHG
VAS RIGHT ANKLE BP: 144 MMHG
VAS RIGHT DORSALIS PEDIS BP: 144 MMHG

## 2023-04-17 DIAGNOSIS — K21.9 GASTROESOPHAGEAL REFLUX DISEASE WITHOUT ESOPHAGITIS: ICD-10-CM

## 2023-04-17 RX ORDER — OMEPRAZOLE 20 MG/1
20 CAPSULE, DELAYED RELEASE ORAL DAILY
Qty: 90 CAPSULE | Refills: 1 | Status: SHIPPED | OUTPATIENT
Start: 2023-04-17

## 2023-04-18 ENCOUNTER — OFFICE VISIT (OUTPATIENT)
Dept: ENT CLINIC | Age: 83
End: 2023-04-18
Payer: MEDICARE

## 2023-04-18 VITALS
BODY MASS INDEX: 21.37 KG/M2 | HEIGHT: 72 IN | SYSTOLIC BLOOD PRESSURE: 140 MMHG | RESPIRATION RATE: 20 BRPM | HEART RATE: 64 BPM | DIASTOLIC BLOOD PRESSURE: 80 MMHG | OXYGEN SATURATION: 97 % | WEIGHT: 157.8 LBS

## 2023-04-18 DIAGNOSIS — S09.91XD INJURY OF EAR CANAL, SUBSEQUENT ENCOUNTER: Primary | ICD-10-CM

## 2023-04-18 PROCEDURE — 1123F ACP DISCUSS/DSCN MKR DOCD: CPT | Performed by: OTOLARYNGOLOGY

## 2023-04-18 PROCEDURE — G8420 CALC BMI NORM PARAMETERS: HCPCS | Performed by: OTOLARYNGOLOGY

## 2023-04-18 PROCEDURE — G8510 SCR DEP NEG, NO PLAN REQD: HCPCS | Performed by: OTOLARYNGOLOGY

## 2023-04-18 PROCEDURE — G8428 CUR MEDS NOT DOCUMENT: HCPCS | Performed by: OTOLARYNGOLOGY

## 2023-04-18 PROCEDURE — 3077F SYST BP >= 140 MM HG: CPT | Performed by: OTOLARYNGOLOGY

## 2023-04-18 PROCEDURE — 99212 OFFICE O/P EST SF 10 MIN: CPT | Performed by: OTOLARYNGOLOGY

## 2023-04-18 PROCEDURE — 3079F DIAST BP 80-89 MM HG: CPT | Performed by: OTOLARYNGOLOGY

## 2023-04-18 PROCEDURE — 1101F PT FALLS ASSESS-DOCD LE1/YR: CPT | Performed by: OTOLARYNGOLOGY

## 2023-04-18 PROCEDURE — G8536 NO DOC ELDER MAL SCRN: HCPCS | Performed by: OTOLARYNGOLOGY

## 2023-04-18 NOTE — PROGRESS NOTES
Otolaryngology-Head and Neck Surgery  Follow Up Patient Visit     Patient: Arturo Rangel  YOB: 1940  MRN: [id number]  Date of Service: 4/18/2023    Chief Complaint:   Chief Complaint   Patient presents with    Follow-up       Interval hx  4/18/2023  No issues   No otalgia, otorrhea    History of Present Illness: Arturo Rangel is a 80y.o. year old male who presents today for discussion of his ears    Developed left otalgia, tried using q tip to clear  Seen by PCP yesterday and noted to have left ear foreign body (cotton)  Was able to be removed however he had some bleeding the ear canal -- he was therefore referred to use today    Denies pain  Dukes Gave further bleeding    No prior ear surgeries       Past Medical History:  Past Medical History:   Diagnosis Date    Benign prostatic hyperplasia 6/5/2017    Essential hypertension 6/5/2017    Full thickness rotator cuff tear 6/5/2017    Full thickness rotator cuff tear 6/5/2017    Glaucoma of left eye 12/29/2017    Glaucoma of left eye 12/29/2017    Left inguinal hernia 6/5/2017    Left inguinal hernia 6/5/2017    Stented coronary artery 3/24/2022       Past Surgical History:   Past Surgical History:   Procedure Laterality Date    HX CATARACT REMOVAL      HX CHOLECYSTECTOMY      HX HEART CATHETERIZATION  02/2022    HX HERNIA REPAIR  05/31/2016    Dr. Lionel Aguillon HERNIA REPAIR  11/30/2020    Inguinal-Dr. Lionel Aguillon ORTHOPAEDIC Left     Shoulder    HX ORTHOPAEDIC Right     Shoulder    HX ORTHOPAEDIC Right 03/22/2018    Hammer toe surgery        Medications:   Current Outpatient Medications   Medication Instructions    aspirin delayed-release 81 mg, Oral, DAILY    atorvastatin (LIPITOR) 40 mg tablet 1 Tablet, Oral, DAILY    clopidogreL (PLAVIX) 75 mg tab 1 Tablet, Oral, DAILY    diclofenac (VOLTAREN) 1 % gel USE 2 GRAMS AS DIRECTED TO THE AFFECTED AREA 4 TIMES A DAY    Dutasteride-Tamsulosin 0.5-0.4 mg CM24 1 Capsule, Oral, DAILY    gabapentin (NEURONTIN) 100 mg capsule 2 Capsules, Oral, DAILY    latanoprost (XALATAN) 0.005 % ophthalmic solution INSTILL 1 DROP INTO LEFT EYE AT BEDTIME    lisinopril-hydroCHLOROthiazide (PRINZIDE, ZESTORETIC) 10-12.5 mg per tablet TAKE 1 TABLET BY MOUTH EVERY DAY    metoprolol tartrate (LOPRESSOR) 25 mg tablet 0.5 Tablets, Oral, 2 TIMES DAILY    nitroglycerin (NITROSTAT) 0.4 mg SL tablet No dose, route, or frequency recorded. omeprazole (PRILOSEC) 20 mg, Oral, DAILY       Allergies:   No Known Allergies    Social History:   Social History     Tobacco Use    Smoking status: Former     Packs/day: 0.50     Years: 10.00     Pack years: 5.00     Types: Cigarettes     Quit date:      Years since quittin.3    Smokeless tobacco: Never   Substance Use Topics    Alcohol use: Never    Drug use: Never        Family History:  Family History   Problem Relation Age of Onset    Heart Disease Mother     Cancer Brother        Review of Systems:    Consitutional: denies fever, excessive weight gain or loss. Eyes: denies diplopia, eye pain. Integumentary: denies new concerning skin lesions. Ears, Nose, Mouth, Throat: denies except as per HPI. Endocrine: denies hot or cold intolerance, increased thirst.  Respiratory: denies cough, hemoptysis, wheezing  Gastrointestinal: denies trouble swallowing, nausea, emesis, regurgitation  Musculoskeletal: denies muscle weakness or wasting  Cardiovascular: denies chest pain, shortness of breath  Neurologic: denies seizures, numbness or tingling, syncope  Hematologic: denies easy bleeding or bruising    Physical Examination:   There were no vitals filed for this visit. General: Comfortable, pleasant, appears stated age  Voice: Strong, speaking in full sentences, no stridor    Face: No masses or lesions, facial strength symmetric   Ears: External ears unremarkable. Ear canal with healing scabbing which looks fine. Otherwise TM clear and intact   Nose: External nose unremarkable.  Dorsum midline. Anterior rhinoscopy demonstrates no lesions. Septum midline. Turbinates without hypertrophy. Oral Cavity / Oropharynx: No trismus. Mucosa pink and moist. No lesions. Tongue is midline and mobile. Palate elevates symmetrically. Uvula midline. Tonsils unremarkable. Base of tongue soft. Floor of mouth soft. Neck: Supple. No adenopathy. Thyroid unremarkable. Palpable laryngeal landmarks. Full neck range of motion   Neurologic: CN II - XI intact. Normal gait      Assessment and Plan:   Left ear canal avulsion  Right ear canal foreign body  Anticoagulation  Left ear bleeding  - Left ear canal avulsion is healing wihtout issues  - Minimal residual scabbing  - Signs to watch out for reviewed  - Follow up PRN    The patient was instructed to return to clinic if no improvement or progression of symptoms. Signs to watch out for reviewed.       MD [facility]  [address] [place] [address]  Office Phone: [phone]

## 2023-04-18 NOTE — PROGRESS NOTES
Chief Complaint   Patient presents with    Follow-up       Visit Vitals  BP (!) 140/80   Pulse 64   Resp 20   Ht 6' (1.829 m)   Wt 157 lb 12.8 oz (71.6 kg)   SpO2 97%   BMI 21.40 kg/m² no

## 2023-04-29 ENCOUNTER — HOSPITAL ENCOUNTER (EMERGENCY)
Age: 83
Discharge: HOME | End: 2023-04-29
Attending: EMERGENCY MEDICINE
Payer: MEDICARE

## 2023-04-29 VITALS
RESPIRATION RATE: 18 BRPM | HEIGHT: 72 IN | SYSTOLIC BLOOD PRESSURE: 167 MMHG | HEART RATE: 69 BPM | WEIGHT: 157 LBS | BODY MASS INDEX: 21.26 KG/M2 | OXYGEN SATURATION: 98 % | DIASTOLIC BLOOD PRESSURE: 82 MMHG | TEMPERATURE: 98.7 F

## 2023-04-29 DIAGNOSIS — L03.115 CELLULITIS OF RIGHT LOWER LEG: ICD-10-CM

## 2023-04-29 DIAGNOSIS — S81.811A LACERATION OF RIGHT LOWER LEG, INITIAL ENCOUNTER: Primary | ICD-10-CM

## 2023-04-29 DIAGNOSIS — M25.471 ANKLE EDEMA, BILATERAL: ICD-10-CM

## 2023-04-29 DIAGNOSIS — M25.472 ANKLE EDEMA, BILATERAL: ICD-10-CM

## 2023-04-29 PROCEDURE — 74011250636 HC RX REV CODE- 250/636: Performed by: EMERGENCY MEDICINE

## 2023-04-29 PROCEDURE — 90471 IMMUNIZATION ADMIN: CPT

## 2023-04-29 PROCEDURE — 74011250637 HC RX REV CODE- 250/637: Performed by: EMERGENCY MEDICINE

## 2023-04-29 PROCEDURE — 90714 TD VACC NO PRESV 7 YRS+ IM: CPT | Performed by: EMERGENCY MEDICINE

## 2023-04-29 PROCEDURE — 99284 EMERGENCY DEPT VISIT MOD MDM: CPT

## 2023-04-29 RX ORDER — CEPHALEXIN 250 MG/1
500 CAPSULE ORAL
Status: COMPLETED | OUTPATIENT
Start: 2023-04-29 | End: 2023-04-29

## 2023-04-29 RX ORDER — CEPHALEXIN 500 MG/1
500 CAPSULE ORAL 3 TIMES DAILY
Qty: 21 CAPSULE | Refills: 0 | Status: SHIPPED | OUTPATIENT
Start: 2023-04-29 | End: 2023-05-06

## 2023-04-29 RX ADMIN — CEPHALEXIN 500 MG: 250 CAPSULE ORAL at 18:06

## 2023-04-29 RX ADMIN — CLOSTRIDIUM TETANI TOXOID ANTIGEN (FORMALDEHYDE INACTIVATED) AND CORYNEBACTERIUM DIPHTHERIAE TOXOID ANTIGEN (FORMALDEHYDE INACTIVATED) 0.5 ML: 5; 2 INJECTION, SUSPENSION INTRAMUSCULAR at 18:06

## 2023-04-29 NOTE — ED TRIAGE NOTES
Pt reports lac to right shin. PT states 2-3 days ago he hit area with a briar cutting tool. Area noted red and swollen around healing lac. No bleeding or discharge noted. Pt reports intermittent pain. Per pt tetanus is up to date.

## 2023-04-29 NOTE — ED PROVIDER NOTES
[facility] EMERGENCY DEPT  EMERGENCY DEPARTMENT HISTORY AND PHYSICAL EXAM      Date: 4/29/2023  Patient Name: Saadia Smith  MRN: [id number]  [place]: 1940  Date of evaluation: 4/29/2023  Provider: Macy Hu MD   Note Started: 5:54 PM 4/29/23    HISTORY OF PRESENT ILLNESS     Chief Complaint   Patient presents with    Leg Injury    Laceration       History Provided By: Patient    HPI: Saadia Smith is a 80 y.o. male who suffered a laceration to mid-distal aspect of RLE with a bush cutter. He has been washing it with peroxide but was concerned as he developed redness around it. It has not been bleeding and wound itself healing. He thinks he is closer to ten years on tetanus but isn't sure. He denies any drainage or odor from wound. He saw his Podiatrist last week and did testing on his legs and was told his circulation was ok.     PAST MEDICAL HISTORY   Past Medical History:  Past Medical History:   Diagnosis Date    Benign prostatic hyperplasia 6/5/2017    Essential hypertension 6/5/2017    Full thickness rotator cuff tear 6/5/2017    Full thickness rotator cuff tear 6/5/2017    Glaucoma of left eye 12/29/2017    Glaucoma of left eye 12/29/2017    Left inguinal hernia 6/5/2017    Left inguinal hernia 6/5/2017    Stented coronary artery 3/24/2022       Past Surgical History:  Past Surgical History:   Procedure Laterality Date    HX CATARACT REMOVAL      HX CHOLECYSTECTOMY      HX HEART CATHETERIZATION  02/2022    HX HERNIA REPAIR  05/31/2016    Dr. Nayely Mccord HERNIA REPAIR  11/30/2020    Inguinal-Dr. Nayely Mccord ORTHOPAEDIC Left     Shoulder    HX ORTHOPAEDIC Right     Shoulder    HX ORTHOPAEDIC Right 03/22/2018    Hammer toe surgery        Family History:  Family History   Problem Relation Age of Onset    Heart Disease Mother     Cancer Brother        Social History:  Social History     Tobacco Use    Smoking status: Former     Packs/day: 0.50     Years: 10.00     Pack years: 5.00     Types: Cigarettes Quit date: 36     Years since quittin.3    Smokeless tobacco: Never   Substance Use Topics    Alcohol use: Never    Drug use: Never       Allergies:  No Known Allergies    PCP: Vishnu Lemus NP    Current Meds:   Previous Medications    ASPIRIN DELAYED-RELEASE 81 MG TABLET    Take 81 mg by mouth daily. ATORVASTATIN (LIPITOR) 40 MG TABLET    Take 1 Tablet by mouth daily. CLOPIDOGREL (PLAVIX) 75 MG TAB    Take 1 Tablet by mouth daily. DICLOFENAC (VOLTAREN) 1 % GEL    USE 2 GRAMS AS DIRECTED TO THE AFFECTED AREA 4 TIMES A DAY    DUTASTERIDE-TAMSULOSIN 0.5-0.4 MG CM24    Take 1 Capsule by mouth daily. Indications: enlarged prostate with urination problem    GABAPENTIN (NEURONTIN) 100 MG CAPSULE    Take 2 Capsules by mouth daily. LATANOPROST (XALATAN) 0.005 % OPHTHALMIC SOLUTION    INSTILL 1 DROP INTO LEFT EYE AT BEDTIME    LISINOPRIL-HYDROCHLOROTHIAZIDE (PRINZIDE, ZESTORETIC) 10-12.5 MG PER TABLET    TAKE 1 TABLET BY MOUTH EVERY DAY    METOPROLOL TARTRATE (LOPRESSOR) 25 MG TABLET    Take 0.5 Tablets by mouth two (2) times a day. NITROGLYCERIN (NITROSTAT) 0.4 MG SL TABLET        OMEPRAZOLE (PRILOSEC) 20 MG CAPSULE    Take 1 Capsule by mouth daily. PHYSICAL EXAM     ED Triage Vitals   ED Encounter Vitals Group      BP 23 1732 (!) 167/82      Pulse (Heart Rate) 23 1732 69      Resp Rate 23 1732 18      Temp 23 1732 98.7 °F (37.1 °C)      Temp src --       O2 Sat (%) 23 1732 98 %      Weight 23 1731 157 lb      Height 23 1731 6'      Physical Exam  Vitals and nursing note reviewed. Constitutional:       General: He is not in acute distress. Appearance: He is not ill-appearing. Comments: FEBRILE     Cardiovascular:      Rate and Rhythm: Normal rate. Pulmonary:      Effort: Pulmonary effort is normal.   Musculoskeletal:      Right lower leg: Edema present. Left lower leg: Edema present.    Skin:     Capillary Refill: Capillary refill takes less than 2 seconds. Findings: Erythema and lesion present. Neurological:      Mental Status: Mental status is at baseline. SCREENINGS              LAB, EKG AND DIAGNOSTIC RESULTS   Labs:  No results found for this or any previous visit (from the past 12 hour(s)). EKG: Initial EKG interpreted by me. Not Applicable    Radiologic Studies:  Non-plain film images such as CT, Ultrasound and MRI are read by the radiologist. Plain radiographic images are visualized and preliminarily interpreted by the ED Physician with the following findings: Not Applicable    Interpretation per the Radiologist below, if available at the time of this note:  No results found. ED COURSE and DIFFERENTIAL DIAGNOSIS/MDM   CC/HPI/PE Summary, DDx: laceration, cellulitis, chronic ankle swelling (at baseline wearing compression hose), need for tetanus update    Records Reviewed (source and summary of external notes): Prior medical records and Nursing notes    Vitals:    Vitals:    04/29/23 1731 04/29/23 1732   BP:  (!) 167/82   Pulse:  69   Resp:  18   Temp:  98.7 °F (37.1 °C)   SpO2:  98%   Weight: 71.2 kg (157 lb)    Height: 6' (1.829 m)         ED COURSE       Disposition Considerations (Tests not done, Shared Decision Making, Pt Expectation of Test or Treatment.): Not Applicable    Patient was given the following medications:  Medications   cephALEXin (KEFLEX) capsule 500 mg (has no administration in time range)   tetanus-diphtheria toxoids-Td (Td) 2-2 Lf unit/0.5 mL injection 0.5 mL (has no administration in time range)       CONSULTS: (Who and What was discussed)  None     Social Determinants affecting Dx or Tx: None    Smoking Cessation: Not Applicable    PROCEDURES   Unless otherwise noted above, none. Procedures      CRITICAL CARE TIME   Patient does not meet Critical Care Time, 0 minutes    FINAL IMPRESSION     1. Laceration of right lower leg, initial encounter    2. Cellulitis of right lower leg    3.  Ankle edema, bilateral          DISPOSITION/PLAN       Discharge Note: The patient is stable for discharge home. The signs, symptoms, diagnosis, and discharge instructions have been discussed, understanding conveyed, and agreed upon. The patient is to follow up as recommended or return to ER should their symptoms worsen. PATIENT REFERRED TO:  Follow-up Information       Follow up With Specialties Details Why Contact Info        FOLLOW-UP WITH PCP ABOUT ANKLE SWELLING    [facility] EMERGENCY DEPT Emergency Medicine In 2 days For wound re-check IF REDNESS not improving or getting worse [address] [place] [address]  [phone]              DISCHARGE MEDICATIONS:  Current Discharge Medication List        START taking these medications    Details   cephALEXin (Keflex) 500 mg capsule Take 1 Capsule by mouth three (3) times daily for 7 days. Qty: 21 Capsule, Refills: 0  Start date: 4/29/2023, End date: 5/6/2023               DISCONTINUED MEDICATIONS:  Current Discharge Medication List          I am the Primary Clinician of Record: Gerri Bhardwaj MD (electronically signed)    (Please note that parts of this dictation were completed with voice recognition software. Quite often unanticipated grammatical, syntax, homophones, and other interpretive errors are inadvertently transcribed by the computer software. Please disregards these errors.  Please excuse any errors that have escaped final proofreading.)

## 2023-05-21 RX ORDER — DUTASTERIDE AND TAMSULOSIN HYDROCHLORIDE CAPSULES .5; .4 MG/1; MG/1
1 CAPSULE ORAL DAILY
COMMUNITY
Start: 2022-10-28 | End: 2023-07-23

## 2023-05-21 RX ORDER — OMEPRAZOLE 20 MG/1
20 CAPSULE, DELAYED RELEASE ORAL DAILY
COMMUNITY
Start: 2023-04-17

## 2023-05-21 RX ORDER — ATORVASTATIN CALCIUM 40 MG/1
1 TABLET, FILM COATED ORAL DAILY
COMMUNITY

## 2023-05-21 RX ORDER — CLOPIDOGREL BISULFATE 75 MG/1
1 TABLET ORAL DAILY
COMMUNITY

## 2023-05-21 RX ORDER — GABAPENTIN 100 MG/1
2 CAPSULE ORAL DAILY
COMMUNITY

## 2023-05-21 RX ORDER — LATANOPROST 50 UG/ML
SOLUTION/ DROPS OPHTHALMIC
COMMUNITY
Start: 2023-03-22

## 2023-05-21 RX ORDER — NITROGLYCERIN 0.4 MG/1
TABLET SUBLINGUAL
COMMUNITY
Start: 2022-02-23

## 2023-05-21 RX ORDER — ASPIRIN 81 MG/1
81 TABLET ORAL DAILY
COMMUNITY

## 2023-05-21 RX ORDER — LISINOPRIL AND HYDROCHLOROTHIAZIDE 12.5; 1 MG/1; MG/1
1 TABLET ORAL DAILY
COMMUNITY
Start: 2023-01-29

## 2023-07-22 DIAGNOSIS — N40.1 BENIGN PROSTATIC HYPERPLASIA WITH LOWER URINARY TRACT SYMPTOMS: ICD-10-CM

## 2023-07-23 RX ORDER — DUTASTERIDE AND TAMSULOSIN HYDROCHLORIDE CAPSULES .5; .4 MG/1; MG/1
CAPSULE ORAL
Qty: 90 CAPSULE | Refills: 1 | Status: SHIPPED | OUTPATIENT
Start: 2023-07-23

## 2023-08-07 DIAGNOSIS — I10 ESSENTIAL (PRIMARY) HYPERTENSION: ICD-10-CM

## 2023-08-07 RX ORDER — LISINOPRIL AND HYDROCHLOROTHIAZIDE 12.5; 1 MG/1; MG/1
TABLET ORAL
Qty: 90 TABLET | Refills: 1 | Status: SHIPPED | OUTPATIENT
Start: 2023-08-07

## 2023-09-07 ENCOUNTER — OFFICE VISIT (OUTPATIENT)
Facility: CLINIC | Age: 83
End: 2023-09-07
Payer: MEDICARE

## 2023-09-07 VITALS
DIASTOLIC BLOOD PRESSURE: 55 MMHG | RESPIRATION RATE: 18 BRPM | BODY MASS INDEX: 21.39 KG/M2 | TEMPERATURE: 97.8 F | HEART RATE: 69 BPM | HEIGHT: 70 IN | WEIGHT: 149.4 LBS | OXYGEN SATURATION: 98 % | SYSTOLIC BLOOD PRESSURE: 131 MMHG

## 2023-09-07 DIAGNOSIS — M19.90 ARTHRITIS: ICD-10-CM

## 2023-09-07 DIAGNOSIS — G62.9 POLYNEUROPATHY, UNSPECIFIED: ICD-10-CM

## 2023-09-07 DIAGNOSIS — E55.9 VITAMIN D DEFICIENCY: ICD-10-CM

## 2023-09-07 DIAGNOSIS — M54.9 CHRONIC NECK AND BACK PAIN: ICD-10-CM

## 2023-09-07 DIAGNOSIS — R49.0 HOARSENESS OF VOICE: ICD-10-CM

## 2023-09-07 DIAGNOSIS — K21.9 GASTRO-ESOPHAGEAL REFLUX DISEASE WITHOUT ESOPHAGITIS: ICD-10-CM

## 2023-09-07 DIAGNOSIS — G89.29 CHRONIC NECK AND BACK PAIN: ICD-10-CM

## 2023-09-07 DIAGNOSIS — I10 ESSENTIAL HYPERTENSION: ICD-10-CM

## 2023-09-07 DIAGNOSIS — I25.10 ATHEROSCLEROSIS OF NATIVE CORONARY ARTERY OF NATIVE HEART WITHOUT ANGINA PECTORIS: ICD-10-CM

## 2023-09-07 DIAGNOSIS — H40.9 GLAUCOMA OF BOTH EYES, UNSPECIFIED GLAUCOMA TYPE: ICD-10-CM

## 2023-09-07 DIAGNOSIS — M54.2 CHRONIC NECK AND BACK PAIN: ICD-10-CM

## 2023-09-07 DIAGNOSIS — Z00.00 MEDICARE ANNUAL WELLNESS VISIT, SUBSEQUENT: Primary | ICD-10-CM

## 2023-09-07 DIAGNOSIS — R73.03 PREDIABETES: ICD-10-CM

## 2023-09-07 DIAGNOSIS — N40.1 BENIGN PROSTATIC HYPERPLASIA WITH LOWER URINARY TRACT SYMPTOMS, SYMPTOM DETAILS UNSPECIFIED: ICD-10-CM

## 2023-09-07 PROCEDURE — 1036F TOBACCO NON-USER: CPT | Performed by: NURSE PRACTITIONER

## 2023-09-07 PROCEDURE — G8420 CALC BMI NORM PARAMETERS: HCPCS | Performed by: NURSE PRACTITIONER

## 2023-09-07 PROCEDURE — 3075F SYST BP GE 130 - 139MM HG: CPT | Performed by: NURSE PRACTITIONER

## 2023-09-07 PROCEDURE — 99214 OFFICE O/P EST MOD 30 MIN: CPT | Performed by: NURSE PRACTITIONER

## 2023-09-07 PROCEDURE — 1123F ACP DISCUSS/DSCN MKR DOCD: CPT | Performed by: NURSE PRACTITIONER

## 2023-09-07 PROCEDURE — G8427 DOCREV CUR MEDS BY ELIG CLIN: HCPCS | Performed by: NURSE PRACTITIONER

## 2023-09-07 PROCEDURE — G0439 PPPS, SUBSEQ VISIT: HCPCS | Performed by: NURSE PRACTITIONER

## 2023-09-07 PROCEDURE — 3078F DIAST BP <80 MM HG: CPT | Performed by: NURSE PRACTITIONER

## 2023-09-07 SDOH — ECONOMIC STABILITY: TRANSPORTATION INSECURITY
IN THE PAST 12 MONTHS, HAS THE LACK OF TRANSPORTATION KEPT YOU FROM MEDICAL APPOINTMENTS OR FROM GETTING MEDICATIONS?: NO

## 2023-09-07 SDOH — ECONOMIC STABILITY: TRANSPORTATION INSECURITY
IN THE PAST 12 MONTHS, HAS LACK OF TRANSPORTATION KEPT YOU FROM MEETINGS, WORK, OR FROM GETTING THINGS NEEDED FOR DAILY LIVING?: NO

## 2023-09-07 SDOH — ECONOMIC STABILITY: HOUSING INSECURITY
IN THE LAST 12 MONTHS, WAS THERE A TIME WHEN YOU DID NOT HAVE A STEADY PLACE TO SLEEP OR SLEPT IN A SHELTER (INCLUDING NOW)?: NO

## 2023-09-07 SDOH — ECONOMIC STABILITY: INCOME INSECURITY: IN THE LAST 12 MONTHS, WAS THERE A TIME WHEN YOU WERE NOT ABLE TO PAY THE MORTGAGE OR RENT ON TIME?: NO

## 2023-09-07 ASSESSMENT — ENCOUNTER SYMPTOMS
FACIAL SWELLING: 0
SHORTNESS OF BREATH: 0
TROUBLE SWALLOWING: 0
EYE DISCHARGE: 0
NAUSEA: 0
EYE REDNESS: 0
BACK PAIN: 0
STRIDOR: 0
ABDOMINAL DISTENTION: 0
SORE THROAT: 0
PHOTOPHOBIA: 0
EYE ITCHING: 0
CHOKING: 0
CONSTIPATION: 0
SINUS PAIN: 0
COLOR CHANGE: 0
COUGH: 0
CHEST TIGHTNESS: 0
WHEEZING: 0
DIARRHEA: 0
VOMITING: 0
BLOOD IN STOOL: 0
APNEA: 0
ABDOMINAL PAIN: 0
EYE PAIN: 0

## 2023-09-07 ASSESSMENT — LIFESTYLE VARIABLES
HOW MANY STANDARD DRINKS CONTAINING ALCOHOL DO YOU HAVE ON A TYPICAL DAY: PATIENT DOES NOT DRINK
HOW OFTEN DO YOU HAVE A DRINK CONTAINING ALCOHOL: NEVER

## 2023-09-07 ASSESSMENT — PATIENT HEALTH QUESTIONNAIRE - PHQ9
2. FEELING DOWN, DEPRESSED OR HOPELESS: 0
1. LITTLE INTEREST OR PLEASURE IN DOING THINGS: 0
SUM OF ALL RESPONSES TO PHQ QUESTIONS 1-9: 0
SUM OF ALL RESPONSES TO PHQ9 QUESTIONS 1 & 2: 0

## 2023-09-07 NOTE — PATIENT INSTRUCTIONS
[id number] [employer]. Care instructions adapted under license by [facility] ([facility]). If you have questions about a medical condition or this instruction, always ask your healthcare professional. [address] any warranty or liability for your use of this information. Personalized Preventive Plan for Santo Harrison - 9/7/2023  Medicare offers a range of preventive health benefits. Some of the tests and screenings are paid in full while other may be subject to a deductible, co-insurance, and/or copay. Some of these benefits include a comprehensive review of your medical history including lifestyle, illnesses that may run in your family, and various assessments and screenings as appropriate. After reviewing your medical record and screening and assessments performed today your provider may have ordered immunizations, labs, imaging, and/or referrals for you. A list of these orders (if applicable) as well as your Preventive Care list are included within your After Visit Summary for your review. Other Preventive Recommendations:    A preventive eye exam performed by an eye specialist is recommended every 1-2 years to screen for glaucoma; cataracts, macular degeneration, and other eye disorders. A preventive dental visit is recommended every 6 months. Try to get at least 150 minutes of exercise per week or 10,000 steps per day on a pedometer . Order or download the FREE \"Exercise & Physical Activity: Your Everyday Guide\" from [employer]. Call 1-913.107.7351 or search [employer] online. You need 6151-6956 mg of calcium and 0130-1915 IU of vitamin D per day. It is possible to meet your calcium requirement with diet alone, but a vitamin D supplement is usually necessary to meet this goal.  When exposed to the sun, use a sunscreen that protects against both UVA and UVB radiation with an SPF of 30 or greater.  Reapply every 2 to 3 hours

## 2023-09-07 NOTE — PROGRESS NOTES
Chief Complaint   Patient presents with    Medicare AW     Wellness    Pt brought a list of the meds, went over list in chart, pt confirmed      Pt want s to know what all his medicine is for, why is he taking them. He said he has come some what forgetful     1. Have you been to the ER, urgent care clinic since your last visit? Hospitalized since your last visit? No    2. Have you seen or consulted any other health care providers outside of the [address] [place] [address] since your last visit? Include any pap smears or colon screening.  No
TABLET BY MOUTH EVERY DAY 90 tablet 1    dutasteride-tamsulosin 0.5-0.4 MG CAPS TAKE 1 CAPSULE BY MOUTH DAILY. INDICATIONS: ENLARGED PROSTATE WITH URINATION PROBLEM 90 capsule 1    aspirin 81 MG EC tablet Take 1 tablet by mouth daily      atorvastatin (LIPITOR) 40 MG tablet Take 1 tablet by mouth daily      clopidogrel (PLAVIX) 75 MG tablet Take 1 tablet by mouth daily      diclofenac sodium (VOLTAREN) 1 % GEL USE 2 GRAMS AS DIRECTED TO THE AFFECTED AREA 4 TIMES A DAY      gabapentin (NEURONTIN) 100 MG capsule Take 2 capsules by mouth daily. latanoprost (XALATAN) 0.005 % ophthalmic solution INSTILL 1 DROP INTO LEFT EYE AT BEDTIME      metoprolol tartrate (LOPRESSOR) 25 MG tablet Take 0.5 tablets by mouth 2 times daily      nitroGLYCERIN (NITROSTAT) 0.4 MG SL tablet ceived the following from [facility]: Outside name: nitroglycerin (NITROSTAT) 0.4 mg SL tablet      omeprazole (PRILOSEC) 20 MG delayed release capsule Take 1 capsule by mouth daily (Patient not taking: Reported on 9/7/2023)       No current facility-administered medications for this visit. No orders of the defined types were placed in this encounter. Review of Systems   Constitutional:  Negative for activity change, appetite change, chills, diaphoresis, fatigue and fever. HENT:  Negative for congestion, dental problem, ear pain, facial swelling, nosebleeds, sinus pain, sore throat and trouble swallowing. Hoarseness   Eyes:  Negative for photophobia, pain, discharge, redness and itching. Respiratory:  Negative for apnea, cough, choking, chest tightness, shortness of breath, wheezing and stridor. Cardiovascular:  Negative for chest pain, palpitations and leg swelling. Gastrointestinal:  Negative for abdominal distention, abdominal pain, blood in stool, constipation, diarrhea, nausea and vomiting. Endocrine: Negative for polydipsia, polyphagia and polyuria.    Genitourinary:  Negative for difficulty

## 2023-09-09 LAB
25(OH)D3+25(OH)D2 SERPL-MCNC: 24.9 NG/ML (ref 30–100)
ALBUMIN SERPL-MCNC: 4.1 G/DL (ref 3.7–4.7)
ALBUMIN/GLOB SERPL: 1.9 {RATIO} (ref 1.2–2.2)
ALP SERPL-CCNC: 91 IU/L (ref 44–121)
ALT SERPL-CCNC: 14 IU/L (ref 0–44)
AST SERPL-CCNC: 20 IU/L (ref 0–40)
BASOPHILS # BLD AUTO: 0 X10E3/UL (ref 0–0.2)
BASOPHILS NFR BLD AUTO: 1 %
BILIRUB SERPL-MCNC: 0.5 MG/DL (ref 0–1.2)
BUN SERPL-MCNC: 12 MG/DL (ref 8–27)
BUN/CREAT SERPL: 12 (ref 10–24)
CALCIUM SERPL-MCNC: 9.7 MG/DL (ref 8.6–10.2)
CHLORIDE SERPL-SCNC: 100 MMOL/L (ref 96–106)
CHOLEST SERPL-MCNC: 91 MG/DL (ref 100–199)
CO2 SERPL-SCNC: 26 MMOL/L (ref 20–29)
CREAT SERPL-MCNC: 0.98 MG/DL (ref 0.76–1.27)
EGFRCR SERPLBLD CKD-EPI 2021: 77 ML/MIN/1.73
EOSINOPHIL # BLD AUTO: 0.1 X10E3/UL (ref 0–0.4)
EOSINOPHIL NFR BLD AUTO: 4 %
ERYTHROCYTE [DISTWIDTH] IN BLOOD BY AUTOMATED COUNT: 12.9 % (ref 11.6–15.4)
GLOBULIN SER CALC-MCNC: 2.2 G/DL (ref 1.5–4.5)
GLUCOSE SERPL-MCNC: 95 MG/DL (ref 70–99)
HBA1C MFR BLD: 5.8 % (ref 4.8–5.6)
HCT VFR BLD AUTO: 39.1 % (ref 37.5–51)
HDLC SERPL-MCNC: 48 MG/DL
HGB BLD-MCNC: 13.7 G/DL (ref 13–17.7)
IMM GRANULOCYTES # BLD AUTO: 0 X10E3/UL (ref 0–0.1)
IMM GRANULOCYTES NFR BLD AUTO: 0 %
LDLC SERPL CALC-MCNC: 30 MG/DL (ref 0–99)
LYMPHOCYTES # BLD AUTO: 0.8 X10E3/UL (ref 0.7–3.1)
LYMPHOCYTES NFR BLD AUTO: 20 %
MCH RBC QN AUTO: 32.8 PG (ref 26.6–33)
MCHC RBC AUTO-ENTMCNC: 35 G/DL (ref 31.5–35.7)
MCV RBC AUTO: 94 FL (ref 79–97)
MONOCYTES # BLD AUTO: 0.6 X10E3/UL (ref 0.1–0.9)
MONOCYTES NFR BLD AUTO: 14 %
NEUTROPHILS # BLD AUTO: 2.5 X10E3/UL (ref 1.4–7)
NEUTROPHILS NFR BLD AUTO: 61 %
PLATELET # BLD AUTO: 164 X10E3/UL (ref 150–450)
POTASSIUM SERPL-SCNC: 4 MMOL/L (ref 3.5–5.2)
PROT SERPL-MCNC: 6.3 G/DL (ref 6–8.5)
PSA SERPL-MCNC: 2.7 NG/ML (ref 0–4)
RBC # BLD AUTO: 4.18 X10E6/UL (ref 4.14–5.8)
REFLEX CRITERIA: NORMAL
SODIUM SERPL-SCNC: 138 MMOL/L (ref 134–144)
TRIGL SERPL-MCNC: 57 MG/DL (ref 0–149)
TSH SERPL DL<=0.005 MIU/L-ACNC: 1.96 UIU/ML (ref 0.45–4.5)
VLDLC SERPL CALC-MCNC: 13 MG/DL (ref 5–40)
WBC # BLD AUTO: 4.1 X10E3/UL (ref 3.4–10.8)

## 2023-10-16 DIAGNOSIS — I25.10 ATHEROSCLEROSIS OF NATIVE CORONARY ARTERY OF NATIVE HEART WITHOUT ANGINA PECTORIS: Primary | ICD-10-CM

## 2023-10-16 RX ORDER — CLOPIDOGREL BISULFATE 75 MG/1
75 TABLET ORAL DAILY
Qty: 90 TABLET | Refills: 1 | Status: SHIPPED | OUTPATIENT
Start: 2023-10-16

## 2023-10-17 DIAGNOSIS — I10 ESSENTIAL HYPERTENSION: Primary | ICD-10-CM

## 2023-10-17 DIAGNOSIS — I25.10 ATHEROSCLEROSIS OF NATIVE CORONARY ARTERY WITHOUT ANGINA PECTORIS, UNSPECIFIED WHETHER NATIVE OR TRANSPLANTED HEART: ICD-10-CM

## 2023-10-17 RX ORDER — ATORVASTATIN CALCIUM 40 MG/1
40 TABLET, FILM COATED ORAL DAILY
Qty: 90 TABLET | Refills: 1 | Status: SHIPPED | OUTPATIENT
Start: 2023-10-17

## 2023-10-22 DIAGNOSIS — K21.9 GASTRO-ESOPHAGEAL REFLUX DISEASE WITHOUT ESOPHAGITIS: ICD-10-CM

## 2023-10-22 RX ORDER — OMEPRAZOLE 20 MG/1
CAPSULE, DELAYED RELEASE ORAL DAILY
Qty: 90 CAPSULE | Refills: 1 | Status: SHIPPED | OUTPATIENT
Start: 2023-10-22

## 2023-11-14 ENCOUNTER — OFFICE VISIT (OUTPATIENT)
Age: 83
End: 2023-11-14
Payer: MEDICARE

## 2023-11-14 VITALS
RESPIRATION RATE: 16 BRPM | SYSTOLIC BLOOD PRESSURE: 142 MMHG | WEIGHT: 153 LBS | HEIGHT: 70 IN | DIASTOLIC BLOOD PRESSURE: 70 MMHG | BODY MASS INDEX: 21.9 KG/M2 | HEART RATE: 61 BPM | OXYGEN SATURATION: 98 %

## 2023-11-14 DIAGNOSIS — R49.0 MUSCLE TENSION DYSPHONIA: Primary | ICD-10-CM

## 2023-11-14 DIAGNOSIS — K21.9 GASTROESOPHAGEAL REFLUX DISEASE, UNSPECIFIED WHETHER ESOPHAGITIS PRESENT: ICD-10-CM

## 2023-11-14 DIAGNOSIS — J38.7 PRESBYLARYNX: ICD-10-CM

## 2023-11-14 PROCEDURE — G8484 FLU IMMUNIZE NO ADMIN: HCPCS | Performed by: OTOLARYNGOLOGY

## 2023-11-14 PROCEDURE — G8420 CALC BMI NORM PARAMETERS: HCPCS | Performed by: OTOLARYNGOLOGY

## 2023-11-14 PROCEDURE — 3074F SYST BP LT 130 MM HG: CPT | Performed by: OTOLARYNGOLOGY

## 2023-11-14 PROCEDURE — 31575 DIAGNOSTIC LARYNGOSCOPY: CPT | Performed by: OTOLARYNGOLOGY

## 2023-11-14 PROCEDURE — 1123F ACP DISCUSS/DSCN MKR DOCD: CPT | Performed by: OTOLARYNGOLOGY

## 2023-11-14 PROCEDURE — 99213 OFFICE O/P EST LOW 20 MIN: CPT | Performed by: OTOLARYNGOLOGY

## 2023-11-14 PROCEDURE — G8427 DOCREV CUR MEDS BY ELIG CLIN: HCPCS | Performed by: OTOLARYNGOLOGY

## 2023-11-14 PROCEDURE — 3078F DIAST BP <80 MM HG: CPT | Performed by: OTOLARYNGOLOGY

## 2023-11-14 PROCEDURE — 1036F TOBACCO NON-USER: CPT | Performed by: OTOLARYNGOLOGY

## 2023-11-14 NOTE — PROGRESS NOTES
Otolaryngology-Head and Neck Surgery  Follow Up Patient Visit     Patient: Carlos Mcallister  YOB: 1940  MRN: [id number]  Date of Service:  11/14/2023    Chief Complaint:   Chief Complaint   Patient presents with    Follow-up     Hoarseness         History of Present Illness: Carlos Mcallister is a 80 y.o. male who was last seen earlier this year with EAC injury    He presents today with hoarseness which has been going on for some months  Intermittent, voice fluctuates  No complete loss of voice  Denies pain  Denies dysphagia    No public speaking or professional voice use     Past Medical History:  Past Medical History:   Diagnosis Date    Benign prostatic hyperplasia 6/5/2017    Essential hypertension 6/5/2017    Full thickness rotator cuff tear 6/5/2017    Full thickness rotator cuff tear 6/5/2017    Glaucoma of left eye 12/29/2017    Glaucoma of left eye 12/29/2017    Left inguinal hernia 6/5/2017    Left inguinal hernia 6/5/2017    Stented coronary artery 3/24/2022       Past Surgical History:   Past Surgical History:   Procedure Laterality Date    CARDIAC CATHETERIZATION  02/2022    CATARACT REMOVAL      CHOLECYSTECTOMY      HERNIA REPAIR  05/31/2016    Dr. Kathryn Washburn  11/30/2020    Inguinal-Dr. Clinton Nose Right     Shoulder    ORTHOPEDIC SURGERY Right 03/22/2018    Hammer toe surgery     ORTHOPEDIC SURGERY Left     Shoulder       Medications:   Current Outpatient Medications   Medication Instructions    aspirin 81 mg, Oral, DAILY    atorvastatin (LIPITOR) 40 mg, Oral, DAILY    clopidogrel (PLAVIX) 75 mg, Oral, DAILY    diclofenac sodium (VOLTAREN) 1 % GEL USE 2 GRAMS AS DIRECTED TO THE AFFECTED AREA 4 TIMES A DAY    dutasteride-tamsulosin 0.5-0.4 MG CAPS TAKE 1 CAPSULE BY MOUTH DAILY.  INDICATIONS: ENLARGED PROSTATE WITH URINATION PROBLEM    gabapentin (NEURONTIN) 100 MG capsule 2 capsules, Oral, DAILY    latanoprost (XALATAN) 0.005 % ophthalmic solution INSTILL 1 DROP

## 2023-11-16 DIAGNOSIS — G89.29 CHRONIC NECK AND BACK PAIN: Primary | ICD-10-CM

## 2023-11-16 DIAGNOSIS — M54.2 CHRONIC NECK AND BACK PAIN: Primary | ICD-10-CM

## 2023-11-16 DIAGNOSIS — M54.9 CHRONIC NECK AND BACK PAIN: Primary | ICD-10-CM

## 2023-11-16 RX ORDER — GABAPENTIN 100 MG/1
200 CAPSULE ORAL DAILY
Qty: 180 CAPSULE | Refills: 0 | Status: SHIPPED | OUTPATIENT
Start: 2023-11-16 | End: 2024-02-14

## 2023-12-06 ENCOUNTER — TELEPHONE (OUTPATIENT)
Facility: CLINIC | Age: 83
End: 2023-12-06

## 2023-12-06 NOTE — TELEPHONE ENCOUNTER
Pt stated he is has had a nose bleed and his wife had one nose bleed  a couple a weeks ago. pt swears it is one of the medication. He said he went to [facility] yesterday and someone looked in his nose and couldn't find anything but he didn't believe them either.  Pt does not want to talk to me, he was very rude to me as I was trying to ask him questions to get more info

## 2023-12-06 NOTE — TELEPHONE ENCOUNTER
Nose bleed yesterday and would like to speak with nurse about it -thinks it could be a medication causing it

## 2023-12-07 ENCOUNTER — TELEPHONE (OUTPATIENT)
Facility: CLINIC | Age: 83
End: 2023-12-07

## 2023-12-07 NOTE — TELEPHONE ENCOUNTER
If patient returns call, ENT appointment has been scheduled. He will probably ask to speak with me. Anastasiia with Dr. Bruna Tejeda.       Tuesday, 12/19/2023, at 2 PM    PH#:  [phone]

## 2024-01-16 ENCOUNTER — TELEPHONE (OUTPATIENT)
Facility: CLINIC | Age: 84
End: 2024-01-16

## 2024-01-16 NOTE — TELEPHONE ENCOUNTER
Request refills Atorvastatin 40 mg, Clopidogrel 75 mg, Metoprolol 25 mg,  and Tamsulosin  to [facility].

## 2024-01-17 DIAGNOSIS — I10 ESSENTIAL HYPERTENSION: ICD-10-CM

## 2024-01-17 DIAGNOSIS — N40.1 BENIGN PROSTATIC HYPERPLASIA WITH LOWER URINARY TRACT SYMPTOMS, SYMPTOM DETAILS UNSPECIFIED: Primary | ICD-10-CM

## 2024-01-17 DIAGNOSIS — I25.10 ATHEROSCLEROSIS OF NATIVE CORONARY ARTERY OF NATIVE HEART WITHOUT ANGINA PECTORIS: ICD-10-CM

## 2024-01-17 DIAGNOSIS — I25.10 ATHEROSCLEROSIS OF NATIVE CORONARY ARTERY WITHOUT ANGINA PECTORIS, UNSPECIFIED WHETHER NATIVE OR TRANSPLANTED HEART: ICD-10-CM

## 2024-01-17 RX ORDER — ATORVASTATIN CALCIUM 40 MG/1
40 TABLET, FILM COATED ORAL DAILY
Qty: 90 TABLET | Refills: 1 | Status: SHIPPED | OUTPATIENT
Start: 2024-01-17

## 2024-01-17 RX ORDER — CLOPIDOGREL BISULFATE 75 MG/1
75 TABLET ORAL DAILY
Qty: 90 TABLET | Refills: 1 | Status: SHIPPED | OUTPATIENT
Start: 2024-01-17

## 2024-01-17 RX ORDER — TAMSULOSIN HYDROCHLORIDE 0.4 MG/1
0.4 CAPSULE ORAL DAILY
Qty: 90 CAPSULE | Refills: 1 | Status: SHIPPED | OUTPATIENT
Start: 2024-01-17

## 2024-01-17 NOTE — TELEPHONE ENCOUNTER
Request refills Atorvastatin 40 mg, Clopidogrel 75 mg, Metoprolol 25 mg,  and Tamsulosin  to [facility].

## 2024-01-18 DIAGNOSIS — N40.1 BENIGN PROSTATIC HYPERPLASIA WITH LOWER URINARY TRACT SYMPTOMS: ICD-10-CM

## 2024-01-18 RX ORDER — DUTASTERIDE AND TAMSULOSIN HYDROCHLORIDE CAPSULES .5; .4 MG/1; MG/1
CAPSULE ORAL
Qty: 90 CAPSULE | Refills: 1 | Status: SHIPPED | OUTPATIENT
Start: 2024-01-18

## 2024-01-30 DIAGNOSIS — I10 ESSENTIAL (PRIMARY) HYPERTENSION: ICD-10-CM

## 2024-01-30 DIAGNOSIS — G89.29 CHRONIC NECK AND BACK PAIN: ICD-10-CM

## 2024-01-30 DIAGNOSIS — M54.9 CHRONIC NECK AND BACK PAIN: ICD-10-CM

## 2024-01-30 DIAGNOSIS — M54.2 CHRONIC NECK AND BACK PAIN: ICD-10-CM

## 2024-01-30 RX ORDER — GABAPENTIN 100 MG/1
200 CAPSULE ORAL DAILY
Qty: 180 CAPSULE | Refills: 0 | Status: SHIPPED | OUTPATIENT
Start: 2024-01-30 | End: 2024-04-29

## 2024-01-30 RX ORDER — LISINOPRIL AND HYDROCHLOROTHIAZIDE 12.5; 1 MG/1; MG/1
1 TABLET ORAL DAILY
Qty: 90 TABLET | Refills: 1 | Status: SHIPPED | OUTPATIENT
Start: 2024-01-30

## 2024-01-30 NOTE — TELEPHONE ENCOUNTER
Request refill Omeprazole 20 mg, gabapentin 100 mg, Lisinopril HCTZ 12.5 mg to [facility].

## 2024-03-19 ENCOUNTER — OFFICE VISIT (OUTPATIENT)
Facility: CLINIC | Age: 84
End: 2024-03-19
Payer: MEDICARE

## 2024-03-19 VITALS — HEART RATE: 58 BPM | SYSTOLIC BLOOD PRESSURE: 135 MMHG | DIASTOLIC BLOOD PRESSURE: 69 MMHG | TEMPERATURE: 97.3 F

## 2024-03-19 DIAGNOSIS — E55.9 VITAMIN D DEFICIENCY: ICD-10-CM

## 2024-03-19 DIAGNOSIS — N40.1 BENIGN PROSTATIC HYPERPLASIA WITH LOWER URINARY TRACT SYMPTOMS, SYMPTOM DETAILS UNSPECIFIED: ICD-10-CM

## 2024-03-19 DIAGNOSIS — M54.9 CHRONIC NECK AND BACK PAIN: ICD-10-CM

## 2024-03-19 DIAGNOSIS — R73.03 PREDIABETES: ICD-10-CM

## 2024-03-19 DIAGNOSIS — I10 ESSENTIAL (PRIMARY) HYPERTENSION: Primary | ICD-10-CM

## 2024-03-19 DIAGNOSIS — K21.9 GASTRO-ESOPHAGEAL REFLUX DISEASE WITHOUT ESOPHAGITIS: ICD-10-CM

## 2024-03-19 DIAGNOSIS — G89.29 CHRONIC NECK AND BACK PAIN: ICD-10-CM

## 2024-03-19 DIAGNOSIS — G62.9 POLYNEUROPATHY, UNSPECIFIED: ICD-10-CM

## 2024-03-19 DIAGNOSIS — R49.0 HOARSENESS OF VOICE: ICD-10-CM

## 2024-03-19 DIAGNOSIS — I25.10 ATHEROSCLEROSIS OF NATIVE CORONARY ARTERY OF NATIVE HEART WITHOUT ANGINA PECTORIS: ICD-10-CM

## 2024-03-19 DIAGNOSIS — M19.90 ARTHRITIS: ICD-10-CM

## 2024-03-19 DIAGNOSIS — H40.9 GLAUCOMA OF BOTH EYES, UNSPECIFIED GLAUCOMA TYPE: ICD-10-CM

## 2024-03-19 DIAGNOSIS — M54.2 CHRONIC NECK AND BACK PAIN: ICD-10-CM

## 2024-03-19 PROCEDURE — 99214 OFFICE O/P EST MOD 30 MIN: CPT | Performed by: NURSE PRACTITIONER

## 2024-03-19 PROCEDURE — G8420 CALC BMI NORM PARAMETERS: HCPCS | Performed by: NURSE PRACTITIONER

## 2024-03-19 PROCEDURE — G8484 FLU IMMUNIZE NO ADMIN: HCPCS | Performed by: NURSE PRACTITIONER

## 2024-03-19 PROCEDURE — 3078F DIAST BP <80 MM HG: CPT | Performed by: NURSE PRACTITIONER

## 2024-03-19 PROCEDURE — G8427 DOCREV CUR MEDS BY ELIG CLIN: HCPCS | Performed by: NURSE PRACTITIONER

## 2024-03-19 PROCEDURE — 1123F ACP DISCUSS/DSCN MKR DOCD: CPT | Performed by: NURSE PRACTITIONER

## 2024-03-19 PROCEDURE — 1036F TOBACCO NON-USER: CPT | Performed by: NURSE PRACTITIONER

## 2024-03-19 PROCEDURE — 3075F SYST BP GE 130 - 139MM HG: CPT | Performed by: NURSE PRACTITIONER

## 2024-03-19 ASSESSMENT — ENCOUNTER SYMPTOMS
TROUBLE SWALLOWING: 0
CONSTIPATION: 0
ABDOMINAL PAIN: 0
FACIAL SWELLING: 0
EYE PAIN: 0
EYE DISCHARGE: 0
NAUSEA: 0
STRIDOR: 0
ABDOMINAL DISTENTION: 0
CHOKING: 0
SHORTNESS OF BREATH: 0
CHEST TIGHTNESS: 0
DIARRHEA: 0
SINUS PAIN: 0
COUGH: 0
BACK PAIN: 0
EYE REDNESS: 0
PHOTOPHOBIA: 0
BLOOD IN STOOL: 0
VOMITING: 0
WHEEZING: 0
SORE THROAT: 0
APNEA: 0
EYE ITCHING: 0

## 2024-03-19 ASSESSMENT — PATIENT HEALTH QUESTIONNAIRE - PHQ9
SUM OF ALL RESPONSES TO PHQ QUESTIONS 1-9: 0
2. FEELING DOWN, DEPRESSED OR HOPELESS: NOT AT ALL
SUM OF ALL RESPONSES TO PHQ QUESTIONS 1-9: 0
SUM OF ALL RESPONSES TO PHQ QUESTIONS 1-9: 0
SUM OF ALL RESPONSES TO PHQ9 QUESTIONS 1 & 2: 0
SUM OF ALL RESPONSES TO PHQ QUESTIONS 1-9: 0
1. LITTLE INTEREST OR PLEASURE IN DOING THINGS: NOT AT ALL

## 2024-03-19 NOTE — PROGRESS NOTES
Chief Complaint   Patient presents with    Hypertension     FOLLOW UP     Pt has questions about his blood thinners, pt cut himself and he stated it wouldn't stop bleeding. And he bruises easily.      Pt did not bring meds, went over list, pt confirmed     1. Have you been to the ER, urgent care clinic since your last visit?  Hospitalized since your last visit?No    2. Have you seen or consulted any other health care providers outside of the [facility] since your last visit?  Include any pap smears or colon screening. No

## 2024-03-19 NOTE — PROGRESS NOTES
Ed Gardner is a 82 y.o. male who presents to the office today for the following:    Chief Complaint   Patient presents with    Hypertension        Past Medical History:   Diagnosis Date    Benign prostatic hyperplasia 2017    Essential hypertension 2017    Full thickness rotator cuff tear 2017    Full thickness rotator cuff tear 2017    Glaucoma of left eye 2017    Glaucoma of left eye 2017    Left inguinal hernia 2017    Left inguinal hernia 2017    Stented coronary artery 3/24/2022          Past Surgical History:   Procedure Laterality Date    CARDIAC CATHETERIZATION  2022    CATARACT REMOVAL      CHOLECYSTECTOMY      HERNIA REPAIR  2016    Dr. Jimenez    HERNIA REPAIR  2020    Inguinal-Dr. Jimenez    ORTHOPEDIC SURGERY Right     Shoulder    ORTHOPEDIC SURGERY Right 2018    Hammer toe surgery     ORTHOPEDIC SURGERY Left     Shoulder        Family History   Problem Relation Age of Onset    Heart Disease Mother     Cancer Brother         Social History     Socioeconomic History    Marital status:      Spouse name: None    Number of children: None    Years of education: None    Highest education level: None   Tobacco Use    Smoking status: Former     Current packs/day: 0.00     Types: Cigarettes     Quit date: 1980     Years since quittin.2    Smokeless tobacco: Never   Substance and Sexual Activity    Alcohol use: Never    Drug use: Never    Sexual activity: Not Currently     Partners: Female     Social Determinants of Health     Financial Resource Strain: Low Risk  (3/29/2023)    Overall Financial Resource Strain (CARDIA)     Difficulty of Paying Living Expenses: Not hard at all   Transportation Needs: No Transportation Needs (2023)    PRAPARE - Transportation     Lack of Transportation (Medical): No     Lack of Transportation (Non-Medical): No   Physical Activity: Sufficiently Active (2023)    Exercise Vital Sign     Days of

## 2024-04-27 DIAGNOSIS — K21.9 GASTRO-ESOPHAGEAL REFLUX DISEASE WITHOUT ESOPHAGITIS: ICD-10-CM

## 2024-04-28 RX ORDER — OMEPRAZOLE 20 MG/1
CAPSULE, DELAYED RELEASE ORAL DAILY
Qty: 90 CAPSULE | Refills: 1 | Status: SHIPPED | OUTPATIENT
Start: 2024-04-28

## 2024-05-06 ENCOUNTER — TELEPHONE (OUTPATIENT)
Facility: CLINIC | Age: 84
End: 2024-05-06

## 2024-05-07 DIAGNOSIS — I10 ESSENTIAL (PRIMARY) HYPERTENSION: ICD-10-CM

## 2024-05-07 RX ORDER — LISINOPRIL AND HYDROCHLOROTHIAZIDE 12.5; 1 MG/1; MG/1
1 TABLET ORAL DAILY
Qty: 90 TABLET | Refills: 1 | Status: SHIPPED | OUTPATIENT
Start: 2024-05-07

## 2024-05-15 ENCOUNTER — TELEPHONE (OUTPATIENT)
Facility: CLINIC | Age: 84
End: 2024-05-15

## 2024-05-15 DIAGNOSIS — M54.2 CHRONIC NECK AND BACK PAIN: ICD-10-CM

## 2024-05-15 DIAGNOSIS — G89.29 CHRONIC NECK AND BACK PAIN: ICD-10-CM

## 2024-05-15 DIAGNOSIS — M54.9 CHRONIC NECK AND BACK PAIN: ICD-10-CM

## 2024-05-15 RX ORDER — GABAPENTIN 100 MG/1
200 CAPSULE ORAL DAILY
Qty: 180 CAPSULE | Refills: 0 | Status: SHIPPED | OUTPATIENT
Start: 2024-05-15 | End: 2024-08-13

## 2024-06-19 ENCOUNTER — OFFICE VISIT (OUTPATIENT)
Facility: CLINIC | Age: 84
End: 2024-06-19
Payer: MEDICARE

## 2024-06-19 VITALS
RESPIRATION RATE: 20 BRPM | WEIGHT: 155.2 LBS | HEART RATE: 61 BPM | OXYGEN SATURATION: 98 % | DIASTOLIC BLOOD PRESSURE: 70 MMHG | TEMPERATURE: 96.8 F | HEIGHT: 70 IN | BODY MASS INDEX: 22.22 KG/M2 | SYSTOLIC BLOOD PRESSURE: 136 MMHG

## 2024-06-19 DIAGNOSIS — M54.9 CHRONIC NECK AND BACK PAIN: ICD-10-CM

## 2024-06-19 DIAGNOSIS — K21.9 GASTRO-ESOPHAGEAL REFLUX DISEASE WITHOUT ESOPHAGITIS: ICD-10-CM

## 2024-06-19 DIAGNOSIS — N40.1 BENIGN PROSTATIC HYPERPLASIA WITH LOWER URINARY TRACT SYMPTOMS, SYMPTOM DETAILS UNSPECIFIED: ICD-10-CM

## 2024-06-19 DIAGNOSIS — R73.03 PREDIABETES: ICD-10-CM

## 2024-06-19 DIAGNOSIS — H40.9 GLAUCOMA OF BOTH EYES, UNSPECIFIED GLAUCOMA TYPE: ICD-10-CM

## 2024-06-19 DIAGNOSIS — M54.2 CHRONIC NECK AND BACK PAIN: ICD-10-CM

## 2024-06-19 DIAGNOSIS — E55.9 VITAMIN D DEFICIENCY: ICD-10-CM

## 2024-06-19 DIAGNOSIS — M19.90 ARTHRITIS: ICD-10-CM

## 2024-06-19 DIAGNOSIS — R49.0 HOARSENESS OF VOICE: ICD-10-CM

## 2024-06-19 DIAGNOSIS — I10 ESSENTIAL (PRIMARY) HYPERTENSION: Primary | ICD-10-CM

## 2024-06-19 DIAGNOSIS — G89.29 CHRONIC NECK AND BACK PAIN: ICD-10-CM

## 2024-06-19 DIAGNOSIS — I25.10 ATHEROSCLEROSIS OF NATIVE CORONARY ARTERY OF NATIVE HEART WITHOUT ANGINA PECTORIS: ICD-10-CM

## 2024-06-19 DIAGNOSIS — G62.9 POLYNEUROPATHY, UNSPECIFIED: ICD-10-CM

## 2024-06-19 PROCEDURE — 3078F DIAST BP <80 MM HG: CPT | Performed by: NURSE PRACTITIONER

## 2024-06-19 PROCEDURE — 1123F ACP DISCUSS/DSCN MKR DOCD: CPT | Performed by: NURSE PRACTITIONER

## 2024-06-19 PROCEDURE — G8427 DOCREV CUR MEDS BY ELIG CLIN: HCPCS | Performed by: NURSE PRACTITIONER

## 2024-06-19 PROCEDURE — 1036F TOBACCO NON-USER: CPT | Performed by: NURSE PRACTITIONER

## 2024-06-19 PROCEDURE — 3075F SYST BP GE 130 - 139MM HG: CPT | Performed by: NURSE PRACTITIONER

## 2024-06-19 PROCEDURE — 99214 OFFICE O/P EST MOD 30 MIN: CPT | Performed by: NURSE PRACTITIONER

## 2024-06-19 PROCEDURE — G8420 CALC BMI NORM PARAMETERS: HCPCS | Performed by: NURSE PRACTITIONER

## 2024-06-19 RX ORDER — CICLOPIROX 80 MG/ML
SOLUTION TOPICAL
COMMUNITY
Start: 2024-04-15

## 2024-06-19 SDOH — ECONOMIC STABILITY: FOOD INSECURITY: WITHIN THE PAST 12 MONTHS, THE FOOD YOU BOUGHT JUST DIDN'T LAST AND YOU DIDN'T HAVE MONEY TO GET MORE.: NEVER TRUE

## 2024-06-19 SDOH — ECONOMIC STABILITY: FOOD INSECURITY: WITHIN THE PAST 12 MONTHS, YOU WORRIED THAT YOUR FOOD WOULD RUN OUT BEFORE YOU GOT MONEY TO BUY MORE.: NEVER TRUE

## 2024-06-19 SDOH — ECONOMIC STABILITY: INCOME INSECURITY: HOW HARD IS IT FOR YOU TO PAY FOR THE VERY BASICS LIKE FOOD, HOUSING, MEDICAL CARE, AND HEATING?: NOT VERY HARD

## 2024-06-19 ASSESSMENT — ENCOUNTER SYMPTOMS
ABDOMINAL DISTENTION: 0
DIARRHEA: 0
STRIDOR: 0
CHEST TIGHTNESS: 0
BLOOD IN STOOL: 0
CHOKING: 0
EYE PAIN: 0
FACIAL SWELLING: 0
APNEA: 0
SINUS PAIN: 0
WHEEZING: 0
EYE DISCHARGE: 0
COLOR CHANGE: 0
SHORTNESS OF BREATH: 0
NAUSEA: 0
ABDOMINAL PAIN: 0
VOMITING: 0
EYE ITCHING: 0
COUGH: 0
CONSTIPATION: 0
BACK PAIN: 0
SORE THROAT: 0
TROUBLE SWALLOWING: 0
EYE REDNESS: 0
PHOTOPHOBIA: 0

## 2024-06-19 NOTE — PROGRESS NOTES
Ed Gardner is a 82 y.o. male who presents to the office today for the following:    Chief Complaint   Patient presents with    Hypertension        Past Medical History:   Diagnosis Date    Benign prostatic hyperplasia 2017    Essential hypertension 2017    Full thickness rotator cuff tear 2017    Full thickness rotator cuff tear 2017    Glaucoma of left eye 2017    Glaucoma of left eye 2017    Left inguinal hernia 2017    Left inguinal hernia 2017    Stented coronary artery 3/24/2022          Past Surgical History:   Procedure Laterality Date    CARDIAC CATHETERIZATION  2022    CATARACT REMOVAL      CHOLECYSTECTOMY      HERNIA REPAIR  2016    Dr. Jimenez    HERNIA REPAIR  2020    Inguinal-Dr. Jimenez    ORTHOPEDIC SURGERY Right     Shoulder    ORTHOPEDIC SURGERY Right 2018    Hammer toe surgery     ORTHOPEDIC SURGERY Left     Shoulder        Family History   Problem Relation Age of Onset    Heart Disease Mother     Cancer Brother         Social History     Socioeconomic History    Marital status:    Tobacco Use    Smoking status: Former     Current packs/day: 0.00     Types: Cigarettes     Quit date: 1980     Years since quittin.4    Smokeless tobacco: Never   Substance and Sexual Activity    Alcohol use: Never    Drug use: Never    Sexual activity: Not Currently     Partners: Female     Social Determinants of Health     Financial Resource Strain: Low Risk  (2024)    Overall Financial Resource Strain (CARDIA)     Difficulty of Paying Living Expenses: Not very hard   Food Insecurity: No Food Insecurity (2024)    Hunger Vital Sign     Worried About Running Out of Food in the Last Year: Never true     Ran Out of Food in the Last Year: Never true   Transportation Needs: No Transportation Needs (2024)    PRAPARE - Transportation     Lack of Transportation (Medical): No     Lack of Transportation (Non-Medical): No   Physical

## 2024-06-19 NOTE — PROGRESS NOTES
Chief Complaint   Patient presents with    Hypertension     Follow up     Pt did not bring meds, went over list, pt confirmed     \"Have you been to the ER, urgent care clinic since your last visit?  Hospitalized since your last visit?\"    NO    “Have you seen or consulted any other health care providers outside of [facility] since your last visit?”    NO            Click Here for Release of Records Request

## 2024-06-27 LAB
ALBUMIN SERPL-MCNC: 4.1 G/DL (ref 3.7–4.7)
ALBUMIN/CREAT UR: <4 MG/G CREAT (ref 0–29)
ALP SERPL-CCNC: 70 IU/L (ref 44–121)
ALT SERPL-CCNC: 13 IU/L (ref 0–44)
APPEARANCE UR: CLEAR
AST SERPL-CCNC: 25 IU/L (ref 0–40)
BACTERIA #/AREA URNS HPF: NORMAL /[HPF]
BASOPHILS # BLD AUTO: 0 X10E3/UL (ref 0–0.2)
BASOPHILS NFR BLD AUTO: 1 %
BILIRUB SERPL-MCNC: 0.6 MG/DL (ref 0–1.2)
BILIRUB UR QL STRIP: NEGATIVE
BUN SERPL-MCNC: 18 MG/DL (ref 8–27)
BUN/CREAT SERPL: 16 (ref 10–24)
CALCIUM SERPL-MCNC: 9.1 MG/DL (ref 8.6–10.2)
CASTS URNS QL MICRO: NORMAL /LPF
CHLORIDE SERPL-SCNC: 101 MMOL/L (ref 96–106)
CHOLEST SERPL-MCNC: 91 MG/DL (ref 100–199)
CO2 SERPL-SCNC: 24 MMOL/L (ref 20–29)
COLOR UR: YELLOW
CREAT SERPL-MCNC: 1.15 MG/DL (ref 0.76–1.27)
CREAT UR-MCNC: 72.1 MG/DL
EGFRCR SERPLBLD CKD-EPI 2021: 63 ML/MIN/1.73
EOSINOPHIL # BLD AUTO: 0.2 X10E3/UL (ref 0–0.4)
EOSINOPHIL NFR BLD AUTO: 6 %
EPI CELLS #/AREA URNS HPF: NORMAL /HPF (ref 0–10)
ERYTHROCYTE [DISTWIDTH] IN BLOOD BY AUTOMATED COUNT: 12.7 % (ref 11.6–15.4)
GLOBULIN SER CALC-MCNC: 1.9 G/DL (ref 1.5–4.5)
GLUCOSE SERPL-MCNC: 85 MG/DL (ref 70–99)
GLUCOSE UR QL STRIP: NEGATIVE
HBA1C MFR BLD: 5.9 % (ref 4.8–5.6)
HCT VFR BLD AUTO: 40.8 % (ref 37.5–51)
HDLC SERPL-MCNC: 39 MG/DL
HGB BLD-MCNC: 13.7 G/DL (ref 13–17.7)
HGB UR QL STRIP: NEGATIVE
IMM GRANULOCYTES # BLD AUTO: 0 X10E3/UL (ref 0–0.1)
IMM GRANULOCYTES NFR BLD AUTO: 0 %
KETONES UR QL STRIP: NEGATIVE
LDLC SERPL CALC-MCNC: 31 MG/DL (ref 0–99)
LEUKOCYTE ESTERASE UR QL STRIP: NEGATIVE
LYMPHOCYTES # BLD AUTO: 1.1 X10E3/UL (ref 0.7–3.1)
LYMPHOCYTES NFR BLD AUTO: 26 %
MCH RBC QN AUTO: 32.9 PG (ref 26.6–33)
MCHC RBC AUTO-ENTMCNC: 33.6 G/DL (ref 31.5–35.7)
MCV RBC AUTO: 98 FL (ref 79–97)
MICRO URNS: ABNORMAL
MICRO URNS: ABNORMAL
MICROALBUMIN UR-MCNC: <3 UG/ML
MONOCYTES # BLD AUTO: 0.6 X10E3/UL (ref 0.1–0.9)
MONOCYTES NFR BLD AUTO: 14 %
NEUTROPHILS # BLD AUTO: 2.2 X10E3/UL (ref 1.4–7)
NEUTROPHILS NFR BLD AUTO: 53 %
NITRITE UR QL STRIP: NEGATIVE
PH UR STRIP: 8 [PH] (ref 5–7.5)
PLATELET # BLD AUTO: 163 X10E3/UL (ref 150–450)
POTASSIUM SERPL-SCNC: 4.3 MMOL/L (ref 3.5–5.2)
PROT SERPL-MCNC: 6 G/DL (ref 6–8.5)
PROT UR QL STRIP: NEGATIVE
PSA SERPL-MCNC: 3.1 NG/ML (ref 0–4)
RBC # BLD AUTO: 4.17 X10E6/UL (ref 4.14–5.8)
RBC #/AREA URNS HPF: NORMAL /HPF (ref 0–2)
REFLEX CRITERIA: NORMAL
SODIUM SERPL-SCNC: 138 MMOL/L (ref 134–144)
SP GR UR STRIP: 1.01 (ref 1–1.03)
TRIGL SERPL-MCNC: 114 MG/DL (ref 0–149)
TSH SERPL DL<=0.005 MIU/L-ACNC: 1.93 UIU/ML (ref 0.45–4.5)
UROBILINOGEN UR STRIP-MCNC: 0.2 MG/DL (ref 0.2–1)
VLDLC SERPL CALC-MCNC: 21 MG/DL (ref 5–40)
WBC # BLD AUTO: 4 X10E3/UL (ref 3.4–10.8)
WBC #/AREA URNS HPF: NORMAL /HPF (ref 0–5)

## 2024-07-11 ENCOUNTER — TELEPHONE (OUTPATIENT)
Facility: CLINIC | Age: 84
End: 2024-07-11

## 2024-07-11 DIAGNOSIS — I25.10 ATHEROSCLEROSIS OF NATIVE CORONARY ARTERY WITHOUT ANGINA PECTORIS, UNSPECIFIED WHETHER NATIVE OR TRANSPLANTED HEART: ICD-10-CM

## 2024-07-11 RX ORDER — ATORVASTATIN CALCIUM 40 MG/1
40 TABLET, FILM COATED ORAL DAILY
Qty: 90 TABLET | Refills: 1 | Status: SHIPPED | OUTPATIENT
Start: 2024-07-11

## 2024-07-18 ENCOUNTER — OFFICE VISIT (OUTPATIENT)
Facility: CLINIC | Age: 84
End: 2024-07-18
Payer: MEDICARE

## 2024-07-18 VITALS
HEIGHT: 69 IN | DIASTOLIC BLOOD PRESSURE: 85 MMHG | SYSTOLIC BLOOD PRESSURE: 136 MMHG | WEIGHT: 149.2 LBS | OXYGEN SATURATION: 99 % | TEMPERATURE: 97.2 F | RESPIRATION RATE: 18 BRPM | HEART RATE: 67 BPM | BODY MASS INDEX: 22.1 KG/M2

## 2024-07-18 DIAGNOSIS — U07.1 COVID-19: Primary | ICD-10-CM

## 2024-07-18 LAB
EXP DATE SOLUTION: NORMAL
EXP DATE SWAB: NORMAL
EXPIRATION DATE: NORMAL
LOT NUMBER POC: NORMAL
LOT NUMBER SOLUTION: NORMAL
LOT NUMBER SWAB: NORMAL
SARS-COV-2 RNA, POC: POSITIVE

## 2024-07-18 PROCEDURE — 3079F DIAST BP 80-89 MM HG: CPT | Performed by: NURSE PRACTITIONER

## 2024-07-18 PROCEDURE — G8427 DOCREV CUR MEDS BY ELIG CLIN: HCPCS | Performed by: NURSE PRACTITIONER

## 2024-07-18 PROCEDURE — 1036F TOBACCO NON-USER: CPT | Performed by: NURSE PRACTITIONER

## 2024-07-18 PROCEDURE — 1123F ACP DISCUSS/DSCN MKR DOCD: CPT | Performed by: NURSE PRACTITIONER

## 2024-07-18 PROCEDURE — 87635 SARS-COV-2 COVID-19 AMP PRB: CPT | Performed by: NURSE PRACTITIONER

## 2024-07-18 PROCEDURE — G8420 CALC BMI NORM PARAMETERS: HCPCS | Performed by: NURSE PRACTITIONER

## 2024-07-18 PROCEDURE — 3075F SYST BP GE 130 - 139MM HG: CPT | Performed by: NURSE PRACTITIONER

## 2024-07-18 PROCEDURE — 99213 OFFICE O/P EST LOW 20 MIN: CPT | Performed by: NURSE PRACTITIONER

## 2024-07-18 ASSESSMENT — ENCOUNTER SYMPTOMS
COUGH: 1
EYE PAIN: 0
EYE DISCHARGE: 0
ABDOMINAL PAIN: 0
CHEST TIGHTNESS: 0
DIARRHEA: 0
SHORTNESS OF BREATH: 0
VOMITING: 0
WHEEZING: 0
EYE ITCHING: 0
SINUS PRESSURE: 1
NAUSEA: 0
SORE THROAT: 0

## 2024-07-18 ASSESSMENT — PATIENT HEALTH QUESTIONNAIRE - PHQ9
SUM OF ALL RESPONSES TO PHQ QUESTIONS 1-9: 0
SUM OF ALL RESPONSES TO PHQ QUESTIONS 1-9: 0
1. LITTLE INTEREST OR PLEASURE IN DOING THINGS: NOT AT ALL
SUM OF ALL RESPONSES TO PHQ QUESTIONS 1-9: 0
SUM OF ALL RESPONSES TO PHQ9 QUESTIONS 1 & 2: 0
SUM OF ALL RESPONSES TO PHQ QUESTIONS 1-9: 0
2. FEELING DOWN, DEPRESSED OR HOPELESS: NOT AT ALL

## 2024-07-18 NOTE — PROGRESS NOTES
Subjective  Chief Complaint   Patient presents with    Cough    Sinusitis     Drainage since sat     HPI:  Ed Gardner is a 84 y.o. male who presents with concerns regarding cough and nasal congestion starting 6 days ago. Denies any fever, chest pain, shortness of breath or sore throat. Appetite and activity decreased but is drinking plenty of fluids. Was at [employer] event prior to symptoms starting. No household sick contacts.      Past Medical History:   Diagnosis Date    Benign prostatic hyperplasia 2017    Essential hypertension 2017    Full thickness rotator cuff tear 2017    Full thickness rotator cuff tear 2017    Glaucoma of left eye 2017    Glaucoma of left eye 2017    Left inguinal hernia 2017    Left inguinal hernia 2017    Stented coronary artery 3/24/2022        Past Surgical History:   Procedure Laterality Date    CARDIAC CATHETERIZATION  2022    CATARACT REMOVAL      CHOLECYSTECTOMY      HERNIA REPAIR  2016    Dr. Jimenez    HERNIA REPAIR  2020    Inguinal-Dr. Jimenez    ORTHOPEDIC SURGERY Right     Shoulder    ORTHOPEDIC SURGERY Right 2018    Hammer toe surgery     ORTHOPEDIC SURGERY Left     Shoulder        Family History   Problem Relation Age of Onset    Heart Disease Mother     Cancer Brother         Social History     Socioeconomic History    Marital status:      Spouse name: None    Number of children: None    Years of education: None    Highest education level: None   Tobacco Use    Smoking status: Former     Current packs/day: 0.00     Types: Cigarettes     Quit date: 1980     Years since quittin.5    Smokeless tobacco: Never   Substance and Sexual Activity    Alcohol use: Never    Drug use: Never    Sexual activity: Not Currently     Partners: Female     Social Determinants of Health     Financial Resource Strain: Low Risk  (2024)    Overall Financial Resource Strain (CARDIA)     Difficulty of Paying Living

## 2024-07-18 NOTE — PROGRESS NOTES
Chief Complaint   Patient presents with    Cough    Sinusitis     Drainage since sat     Pt said he attended a  on sat and symptoms started then.    \"Have you been to the ER, urgent care clinic since your last visit?  Hospitalized since your last visit?\"    NO    “Have you seen or consulted any other health care providers outside of [facility] since your last visit?”    NO            Click Here for Release of Records Request

## 2024-07-22 DIAGNOSIS — N40.1 BENIGN PROSTATIC HYPERPLASIA WITH LOWER URINARY TRACT SYMPTOMS: ICD-10-CM

## 2024-07-22 RX ORDER — DUTASTERIDE AND TAMSULOSIN HYDROCHLORIDE CAPSULES .5; .4 MG/1; MG/1
CAPSULE ORAL
Qty: 90 CAPSULE | Refills: 1 | Status: SHIPPED | OUTPATIENT
Start: 2024-07-22

## 2024-08-22 DIAGNOSIS — M54.9 CHRONIC NECK AND BACK PAIN: ICD-10-CM

## 2024-08-22 DIAGNOSIS — G89.29 CHRONIC NECK AND BACK PAIN: ICD-10-CM

## 2024-08-22 DIAGNOSIS — M54.2 CHRONIC NECK AND BACK PAIN: ICD-10-CM

## 2024-08-22 RX ORDER — GABAPENTIN 100 MG/1
200 CAPSULE ORAL DAILY
Qty: 180 CAPSULE | Refills: 0 | Status: SHIPPED | OUTPATIENT
Start: 2024-08-22 | End: 2024-11-20

## 2024-09-03 ENCOUNTER — HOSPITAL ENCOUNTER (OUTPATIENT)
Facility: HOSPITAL | Age: 84
Discharge: HOME | End: 2024-09-06
Payer: MEDICARE

## 2024-09-03 DIAGNOSIS — Z01.818 PRE-OP EXAM: ICD-10-CM

## 2024-09-03 LAB
ALBUMIN SERPL-MCNC: 3.6 G/DL (ref 3.5–5)
ALBUMIN/GLOB SERPL: 1.2 (ref 1.1–2.2)
ALP SERPL-CCNC: 95 U/L (ref 45–117)
ALT SERPL-CCNC: 23 U/L (ref 12–78)
ANION GAP SERPL CALC-SCNC: 7 MMOL/L (ref 5–15)
AST SERPL W P-5'-P-CCNC: 26 U/L (ref 15–37)
BILIRUB SERPL-MCNC: 0.4 MG/DL (ref 0.2–1)
BUN SERPL-MCNC: 11 MG/DL (ref 6–20)
BUN/CREAT SERPL: 10 (ref 12–20)
CA-I BLD-MCNC: 9 MG/DL (ref 8.5–10.1)
CHLORIDE SERPL-SCNC: 101 MMOL/L (ref 97–108)
CO2 SERPL-SCNC: 29 MMOL/L (ref 21–32)
CREAT SERPL-MCNC: 1.14 MG/DL (ref 0.7–1.3)
EKG ATRIAL RATE: 59 BPM
EKG DIAGNOSIS: NORMAL
EKG P AXIS: 46 DEGREES
EKG P-R INTERVAL: 201 MS
EKG Q-T INTERVAL: 474 MS
EKG QRS DURATION: 165 MS
EKG QTC CALCULATION (BAZETT): 470 MS
EKG R AXIS: -76 DEGREES
EKG T AXIS: 52 DEGREES
EKG VENTRICULAR RATE: 59 BPM
ERYTHROCYTE [DISTWIDTH] IN BLOOD BY AUTOMATED COUNT: 13 % (ref 11.5–14.5)
GLOBULIN SER CALC-MCNC: 2.9 G/DL (ref 2–4)
GLUCOSE SERPL-MCNC: 131 MG/DL (ref 65–100)
HCT VFR BLD AUTO: 39 % (ref 36.6–50.3)
HGB BLD-MCNC: 13.7 G/DL (ref 12.1–17)
INR PPP: 1 (ref 0.9–1.1)
MCH RBC QN AUTO: 33.3 PG (ref 26–34)
MCHC RBC AUTO-ENTMCNC: 35.1 G/DL (ref 30–36.5)
MCV RBC AUTO: 94.7 FL (ref 80–99)
NRBC # BLD: 0 K/UL (ref 0–0.01)
NRBC BLD-RTO: 0 PER 100 WBC
PLATELET # BLD AUTO: 165 K/UL (ref 150–400)
PMV BLD AUTO: 8.5 FL (ref 8.9–12.9)
POTASSIUM SERPL-SCNC: 3.8 MMOL/L (ref 3.5–5.1)
PROT SERPL-MCNC: 6.5 G/DL (ref 6.4–8.2)
PROTHROMBIN TIME: 13.6 SEC (ref 11.9–14.6)
RBC # BLD AUTO: 4.12 M/UL (ref 4.1–5.7)
SODIUM SERPL-SCNC: 137 MMOL/L (ref 136–145)
WBC # BLD AUTO: 4 K/UL (ref 4.1–11.1)

## 2024-09-03 PROCEDURE — 36415 COLL VENOUS BLD VENIPUNCTURE: CPT

## 2024-09-03 PROCEDURE — 93005 ELECTROCARDIOGRAM TRACING: CPT

## 2024-09-03 PROCEDURE — 80053 COMPREHEN METABOLIC PANEL: CPT

## 2024-09-03 PROCEDURE — 85027 COMPLETE CBC AUTOMATED: CPT

## 2024-09-03 PROCEDURE — 85610 PROTHROMBIN TIME: CPT

## 2024-09-09 ENCOUNTER — HOSPITAL ENCOUNTER (OUTPATIENT)
Facility: HOSPITAL | Age: 84
Discharge: HOME | End: 2024-09-09
Attending: STUDENT IN AN ORGANIZED HEALTH CARE EDUCATION/TRAINING PROGRAM | Admitting: STUDENT IN AN ORGANIZED HEALTH CARE EDUCATION/TRAINING PROGRAM
Payer: MEDICARE

## 2024-09-09 VITALS
OXYGEN SATURATION: 100 % | RESPIRATION RATE: 18 BRPM | DIASTOLIC BLOOD PRESSURE: 89 MMHG | SYSTOLIC BLOOD PRESSURE: 150 MMHG | HEIGHT: 69 IN | HEART RATE: 61 BPM | TEMPERATURE: 97.5 F | BODY MASS INDEX: 23.25 KG/M2 | WEIGHT: 157 LBS

## 2024-09-09 DIAGNOSIS — I20.0 UNSTABLE ANGINA (HCC): ICD-10-CM

## 2024-09-09 PROBLEM — R94.39 ABNORMAL STRESS TEST: Status: ACTIVE | Noted: 2024-09-09

## 2024-09-09 LAB
APTT PPP: 36.1 SEC (ref 21.2–34.1)
ECHO BSA: 1.86 M2
THERAPEUTIC RANGE: ABNORMAL SEC (ref 82–109)

## 2024-09-09 PROCEDURE — C1769 GUIDE WIRE: HCPCS | Performed by: STUDENT IN AN ORGANIZED HEALTH CARE EDUCATION/TRAINING PROGRAM

## 2024-09-09 PROCEDURE — 2709999900 HC NON-CHARGEABLE SUPPLY: Performed by: STUDENT IN AN ORGANIZED HEALTH CARE EDUCATION/TRAINING PROGRAM

## 2024-09-09 PROCEDURE — 7100000001 HC PACU RECOVERY - ADDTL 15 MIN: Performed by: STUDENT IN AN ORGANIZED HEALTH CARE EDUCATION/TRAINING PROGRAM

## 2024-09-09 PROCEDURE — 6360000004 HC RX CONTRAST MEDICATION: Performed by: STUDENT IN AN ORGANIZED HEALTH CARE EDUCATION/TRAINING PROGRAM

## 2024-09-09 PROCEDURE — 85730 THROMBOPLASTIN TIME PARTIAL: CPT

## 2024-09-09 PROCEDURE — 99152 MOD SED SAME PHYS/QHP 5/>YRS: CPT | Performed by: STUDENT IN AN ORGANIZED HEALTH CARE EDUCATION/TRAINING PROGRAM

## 2024-09-09 PROCEDURE — 2580000003 HC RX 258: Performed by: STUDENT IN AN ORGANIZED HEALTH CARE EDUCATION/TRAINING PROGRAM

## 2024-09-09 PROCEDURE — 7100000011 HC PHASE II RECOVERY - ADDTL 15 MIN: Performed by: STUDENT IN AN ORGANIZED HEALTH CARE EDUCATION/TRAINING PROGRAM

## 2024-09-09 PROCEDURE — 7100000000 HC PACU RECOVERY - FIRST 15 MIN: Performed by: STUDENT IN AN ORGANIZED HEALTH CARE EDUCATION/TRAINING PROGRAM

## 2024-09-09 PROCEDURE — 7100000010 HC PHASE II RECOVERY - FIRST 15 MIN: Performed by: STUDENT IN AN ORGANIZED HEALTH CARE EDUCATION/TRAINING PROGRAM

## 2024-09-09 PROCEDURE — 76937 US GUIDE VASCULAR ACCESS: CPT | Performed by: STUDENT IN AN ORGANIZED HEALTH CARE EDUCATION/TRAINING PROGRAM

## 2024-09-09 PROCEDURE — 2500000003 HC RX 250 WO HCPCS: Performed by: STUDENT IN AN ORGANIZED HEALTH CARE EDUCATION/TRAINING PROGRAM

## 2024-09-09 PROCEDURE — 36415 COLL VENOUS BLD VENIPUNCTURE: CPT

## 2024-09-09 PROCEDURE — 93458 L HRT ARTERY/VENTRICLE ANGIO: CPT | Performed by: STUDENT IN AN ORGANIZED HEALTH CARE EDUCATION/TRAINING PROGRAM

## 2024-09-09 PROCEDURE — C1894 INTRO/SHEATH, NON-LASER: HCPCS | Performed by: STUDENT IN AN ORGANIZED HEALTH CARE EDUCATION/TRAINING PROGRAM

## 2024-09-09 PROCEDURE — 6360000002 HC RX W HCPCS: Performed by: STUDENT IN AN ORGANIZED HEALTH CARE EDUCATION/TRAINING PROGRAM

## 2024-09-09 RX ORDER — SODIUM CHLORIDE 0.9 % (FLUSH) 0.9 %
5-40 SYRINGE (ML) INJECTION EVERY 12 HOURS SCHEDULED
Status: DISCONTINUED | OUTPATIENT
Start: 2024-09-09 | End: 2024-09-09 | Stop reason: HOSPADM

## 2024-09-09 RX ORDER — HEPARIN SODIUM 200 [USP'U]/100ML
INJECTION, SOLUTION INTRAVENOUS CONTINUOUS PRN
Status: COMPLETED | OUTPATIENT
Start: 2024-09-09 | End: 2024-09-09

## 2024-09-09 RX ORDER — SODIUM CHLORIDE 0.9 % (FLUSH) 0.9 %
5-40 SYRINGE (ML) INJECTION PRN
Status: DISCONTINUED | OUTPATIENT
Start: 2024-09-09 | End: 2024-09-09 | Stop reason: HOSPADM

## 2024-09-09 RX ORDER — SODIUM CHLORIDE 9 MG/ML
INJECTION, SOLUTION INTRAVENOUS CONTINUOUS
Status: DISCONTINUED | OUTPATIENT
Start: 2024-09-09 | End: 2024-09-09 | Stop reason: HOSPADM

## 2024-09-09 RX ORDER — LIDOCAINE HYDROCHLORIDE 10 MG/ML
INJECTION, SOLUTION INFILTRATION; PERINEURAL PRN
Status: DISCONTINUED | OUTPATIENT
Start: 2024-09-09 | End: 2024-09-09 | Stop reason: HOSPADM

## 2024-09-09 RX ORDER — IOPAMIDOL 755 MG/ML
INJECTION, SOLUTION INTRAVASCULAR PRN
Status: DISCONTINUED | OUTPATIENT
Start: 2024-09-09 | End: 2024-09-09 | Stop reason: HOSPADM

## 2024-09-09 RX ORDER — MIDAZOLAM HYDROCHLORIDE 1 MG/ML
INJECTION INTRAMUSCULAR; INTRAVENOUS PRN
Status: DISCONTINUED | OUTPATIENT
Start: 2024-09-09 | End: 2024-09-09 | Stop reason: HOSPADM

## 2024-09-09 RX ORDER — FENTANYL CITRATE 50 UG/ML
INJECTION, SOLUTION INTRAMUSCULAR; INTRAVENOUS PRN
Status: DISCONTINUED | OUTPATIENT
Start: 2024-09-09 | End: 2024-09-09 | Stop reason: HOSPADM

## 2024-09-09 RX ORDER — SODIUM CHLORIDE 9 MG/ML
INJECTION, SOLUTION INTRAVENOUS PRN
Status: DISCONTINUED | OUTPATIENT
Start: 2024-09-09 | End: 2024-09-09 | Stop reason: HOSPADM

## 2024-09-09 RX ORDER — ACETAMINOPHEN 325 MG/1
650 TABLET ORAL EVERY 4 HOURS PRN
Status: DISCONTINUED | OUTPATIENT
Start: 2024-09-09 | End: 2024-09-09 | Stop reason: HOSPADM

## 2024-09-09 RX ORDER — 0.9 % SODIUM CHLORIDE 0.9 %
INTRAVENOUS SOLUTION INTRAVENOUS CONTINUOUS PRN
Status: COMPLETED | OUTPATIENT
Start: 2024-09-09 | End: 2024-09-09

## 2024-09-09 RX ORDER — HEPARIN SODIUM 1000 [USP'U]/ML
INJECTION, SOLUTION INTRAVENOUS; SUBCUTANEOUS PRN
Status: DISCONTINUED | OUTPATIENT
Start: 2024-09-09 | End: 2024-09-09 | Stop reason: HOSPADM

## 2024-09-09 RX ORDER — VERAPAMIL HYDROCHLORIDE 2.5 MG/ML
INJECTION, SOLUTION INTRAVENOUS PRN
Status: DISCONTINUED | OUTPATIENT
Start: 2024-09-09 | End: 2024-09-09 | Stop reason: HOSPADM

## 2024-09-09 RX ADMIN — SODIUM CHLORIDE: 9 INJECTION, SOLUTION INTRAVENOUS at 09:00

## 2024-09-09 ASSESSMENT — PAIN - FUNCTIONAL ASSESSMENT
PAIN_FUNCTIONAL_ASSESSMENT: NONE - DENIES PAIN

## 2024-09-25 NOTE — ED TRIAGE NOTES
.  Chief Complaint   Patient presents with    Chest Pain     pt presents with c/o chest and sob that started 1 hr prior to arrival, pt states that he has had these symptoms ongoing for 30 days, pt follows [id number] [place] [address], and has appointment for stress test on monday. PT VS stable, pt states that he is pain free at this time, pt ambulatory and A&Ox4, pt states that he has a tight sensation in chest like his heart is pounding.      Shortness of Breath    Fatigue Cristina is a 39 Seth  Signed Electronically by: Pamela Dent MD

## 2024-10-31 DIAGNOSIS — K21.9 GASTRO-ESOPHAGEAL REFLUX DISEASE WITHOUT ESOPHAGITIS: ICD-10-CM

## 2024-11-19 DIAGNOSIS — M54.9 CHRONIC NECK AND BACK PAIN: ICD-10-CM

## 2024-11-19 DIAGNOSIS — M54.2 CHRONIC NECK AND BACK PAIN: ICD-10-CM

## 2024-11-19 DIAGNOSIS — G89.29 CHRONIC NECK AND BACK PAIN: ICD-10-CM

## 2024-11-19 RX ORDER — GABAPENTIN 100 MG/1
200 CAPSULE ORAL DAILY
Qty: 180 CAPSULE | Refills: 0 | Status: SHIPPED | OUTPATIENT
Start: 2024-11-19 | End: 2025-02-17

## 2024-12-05 ENCOUNTER — NURSE ONLY (OUTPATIENT)
Facility: CLINIC | Age: 84
End: 2024-12-05

## 2024-12-05 DIAGNOSIS — Z23 NEEDS FLU SHOT: Primary | ICD-10-CM

## 2024-12-18 ENCOUNTER — TELEPHONE (OUTPATIENT)
Facility: CLINIC | Age: 84
End: 2024-12-18

## 2024-12-18 DIAGNOSIS — N40.1 BENIGN PROSTATIC HYPERPLASIA WITH LOWER URINARY TRACT SYMPTOMS: ICD-10-CM

## 2024-12-18 RX ORDER — DUTASTERIDE AND TAMSULOSIN HYDROCHLORIDE CAPSULES .5; .4 MG/1; MG/1
1 CAPSULE ORAL DAILY
Qty: 90 CAPSULE | Refills: 1 | Status: SHIPPED | OUTPATIENT
Start: 2024-12-18

## 2024-12-18 NOTE — TELEPHONE ENCOUNTER
Patient is requesting a refill on the medication;    dutasteride-tamsulosin 0.5-0.4 MG CAPS     Pt's pharmacy is [facility] and he is requesting a 90 day supply

## 2024-12-20 ENCOUNTER — OFFICE VISIT (OUTPATIENT)
Facility: CLINIC | Age: 84
End: 2024-12-20

## 2024-12-20 VITALS
TEMPERATURE: 97.6 F | HEART RATE: 69 BPM | OXYGEN SATURATION: 98 % | WEIGHT: 145 LBS | RESPIRATION RATE: 18 BRPM | DIASTOLIC BLOOD PRESSURE: 60 MMHG | SYSTOLIC BLOOD PRESSURE: 138 MMHG | HEIGHT: 69 IN | BODY MASS INDEX: 21.48 KG/M2

## 2024-12-20 DIAGNOSIS — N40.1 BENIGN PROSTATIC HYPERPLASIA WITH LOWER URINARY TRACT SYMPTOMS, SYMPTOM DETAILS UNSPECIFIED: ICD-10-CM

## 2024-12-20 DIAGNOSIS — M54.2 CHRONIC NECK AND BACK PAIN: ICD-10-CM

## 2024-12-20 DIAGNOSIS — R73.03 PREDIABETES: ICD-10-CM

## 2024-12-20 DIAGNOSIS — H40.9 GLAUCOMA OF BOTH EYES, UNSPECIFIED GLAUCOMA TYPE: ICD-10-CM

## 2024-12-20 DIAGNOSIS — M19.90 ARTHRITIS: ICD-10-CM

## 2024-12-20 DIAGNOSIS — G89.29 CHRONIC NECK AND BACK PAIN: ICD-10-CM

## 2024-12-20 DIAGNOSIS — R49.0 HOARSENESS OF VOICE: ICD-10-CM

## 2024-12-20 DIAGNOSIS — I25.10 ATHEROSCLEROSIS OF NATIVE CORONARY ARTERY OF NATIVE HEART WITHOUT ANGINA PECTORIS: ICD-10-CM

## 2024-12-20 DIAGNOSIS — M54.9 CHRONIC NECK AND BACK PAIN: ICD-10-CM

## 2024-12-20 DIAGNOSIS — K21.9 GASTRO-ESOPHAGEAL REFLUX DISEASE WITHOUT ESOPHAGITIS: ICD-10-CM

## 2024-12-20 DIAGNOSIS — E55.9 VITAMIN D DEFICIENCY: ICD-10-CM

## 2024-12-20 DIAGNOSIS — Z00.00 MEDICARE ANNUAL WELLNESS VISIT, SUBSEQUENT: Primary | ICD-10-CM

## 2024-12-20 DIAGNOSIS — G62.9 POLYNEUROPATHY, UNSPECIFIED: ICD-10-CM

## 2024-12-20 DIAGNOSIS — I10 ESSENTIAL (PRIMARY) HYPERTENSION: ICD-10-CM

## 2024-12-20 RX ORDER — ATORVASTATIN CALCIUM 40 MG/1
40 TABLET, FILM COATED ORAL DAILY
Qty: 90 TABLET | Refills: 1 | Status: SHIPPED | OUTPATIENT
Start: 2024-12-20

## 2024-12-20 ASSESSMENT — ENCOUNTER SYMPTOMS
CHEST TIGHTNESS: 0
EYE PAIN: 0
PHOTOPHOBIA: 0
BLOOD IN STOOL: 0
EYE REDNESS: 0
CONSTIPATION: 0
STRIDOR: 0
NAUSEA: 0
ABDOMINAL DISTENTION: 0
EYE ITCHING: 0
EYE DISCHARGE: 0
DIARRHEA: 0
SHORTNESS OF BREATH: 0
COUGH: 0
SINUS PAIN: 0
CHOKING: 0
TROUBLE SWALLOWING: 0
COLOR CHANGE: 0
FACIAL SWELLING: 0
APNEA: 0
SORE THROAT: 0
ABDOMINAL PAIN: 0
BACK PAIN: 0
WHEEZING: 0
VOMITING: 0

## 2024-12-20 ASSESSMENT — LIFESTYLE VARIABLES
HOW OFTEN DO YOU HAVE A DRINK CONTAINING ALCOHOL: NEVER
HOW MANY STANDARD DRINKS CONTAINING ALCOHOL DO YOU HAVE ON A TYPICAL DAY: PATIENT DOES NOT DRINK

## 2024-12-20 ASSESSMENT — PATIENT HEALTH QUESTIONNAIRE - PHQ9
SUM OF ALL RESPONSES TO PHQ QUESTIONS 1-9: 0
SUM OF ALL RESPONSES TO PHQ9 QUESTIONS 1 & 2: 0
SUM OF ALL RESPONSES TO PHQ QUESTIONS 1-9: 0
1. LITTLE INTEREST OR PLEASURE IN DOING THINGS: NOT AT ALL
SUM OF ALL RESPONSES TO PHQ QUESTIONS 1-9: 0
SUM OF ALL RESPONSES TO PHQ QUESTIONS 1-9: 0
2. FEELING DOWN, DEPRESSED OR HOPELESS: NOT AT ALL

## 2024-12-20 NOTE — PROGRESS NOTES
Chief Complaint   Patient presents with    Medicare AWV    Hypertension       Wellness    Pt did not bring meds, went over list, pt confirmed      No other concerns       \"Have you been to the ER, urgent care clinic since your last visit?  Hospitalized since your last visit?\"    NO    “Have you seen or consulted any other health care providers outside our system since your last visit?”    NO                 
(LIPITOR) 40 MG tablet Take 1 tablet by mouth daily Yes Sadaf Aquino APRN - NP   dutasteride-tamsulosin 0.5-0.4 MG CAPS Take 1 capsule by mouth daily  Sadaf Aquino APRN - NP   gabapentin (NEURONTIN) 100 MG capsule Take 2 capsules by mouth daily for 90 days. Max Daily Amount: 200 mg  Sadaf Aquino APRN - NP   omeprazole (PRILOSEC) 20 MG delayed release capsule TAKE 1 CAPSULE BY MOUTH EVERY DAY  Sadaf Aquino APRN - NP   ciclopirox (PENLAC) 8 % solution APPLY TOPICALLY 1 APPLICATION TO AFFECTED AREA DAILY  Provider, MD Ernesto   lisinopril-hydroCHLOROthiazide (PRINZIDE;ZESTORETIC) 10-12.5 MG per tablet Take 1 tablet by mouth daily  Sadaf Aquino APRN - NP   metoprolol tartrate (LOPRESSOR) 25 MG tablet Take 0.5 tablets by mouth 2 times daily  Sadaf Aquino APRN - NP   aspirin 81 MG EC tablet Take 1 tablet by mouth daily  Automatic Reconciliation, Ar   diclofenac sodium (VOLTAREN) 1 % GEL USE 2 GRAMS AS DIRECTED TO THE AFFECTED AREA 4 TIMES A DAY  Automatic Reconciliation, Ar   latanoprost (XALATAN) 0.005 % ophthalmic solution INSTILL 1 DROP INTO LEFT EYE AT BEDTIME  Automatic Reconciliation, Ar       CareTeam (Including outside providers/suppliers regularly involved in providing care):   Patient Care Team:  Sadaf Aquino APRN - NP as PCP - General  Sadaf Aquino APRN - NP as PCP - Empaneled Provider      Reviewed and updated this visit:  Tobacco  Allergies  Meds  Problems  Med Hx  Surg Hx  Soc Hx  Fam Hx

## 2024-12-20 NOTE — PATIENT INSTRUCTIONS
Learning About Being Active as an Older Adult  Why is being active important as you get older?     Being active is one of the best things you can do for your health. And it's never too late to start. Being active--or getting active, if you aren't already--has definite benefits. It can:  Give you more energy,  Keep your mind sharp.  Improve balance to reduce your risk of falls.  Help you manage chronic illness with fewer medicines.  No matter how old you are, how fit you are, or what health problems you have, there is a form of activity that will work for you. And the more physical activity you can do, the better your overall health will be.  What kinds of activity can help you stay healthy?  Being more active will make your daily activities easier. Physical activity includes planned exercise and things you do in daily life. There are four types of activity:  Aerobic.  Doing aerobic activity makes your heart and lungs strong.  Includes walking, dancing, and gardening.  Aim for at least 2½ hours spread throughout the week.  It improves your energy and can help you sleep better.  Muscle-strengthening.  This type of activity can help maintain muscle and strengthen bones.  Includes climbing stairs, using resistance bands, and lifting or carrying heavy loads.  Aim for at least twice a week.  It can help protect the knees and other joints.  Stretching.  Stretching gives you better range of motion in joints and muscles.  Includes upper arm stretches, calf stretches, and gentle yoga.  Aim for at least twice a week, preferably after your muscles are warmed up from other activities.  It can help you function better in daily life.  Balancing.  This helps you stay coordinated and have good posture.  Includes heel-to-toe walking, steph chi, and certain types of yoga.  Aim for at least 3 days a week.  It can reduce your risk of falling.  Even if you have a hard time meeting the recommendations, it's better to be more active

## 2025-02-03 ENCOUNTER — TELEPHONE (OUTPATIENT)
Facility: CLINIC | Age: 85
End: 2025-02-03

## 2025-02-03 DIAGNOSIS — I10 ESSENTIAL (PRIMARY) HYPERTENSION: ICD-10-CM

## 2025-02-03 DIAGNOSIS — K21.9 GASTRO-ESOPHAGEAL REFLUX DISEASE WITHOUT ESOPHAGITIS: ICD-10-CM

## 2025-02-03 RX ORDER — LISINOPRIL AND HYDROCHLOROTHIAZIDE 10; 12.5 MG/1; MG/1
1 TABLET ORAL DAILY
Qty: 90 TABLET | Refills: 1 | Status: SHIPPED | OUTPATIENT
Start: 2025-02-03

## 2025-03-27 ENCOUNTER — TELEPHONE (OUTPATIENT)
Age: 85
End: 2025-03-27

## 2025-03-27 ENCOUNTER — OFFICE VISIT (OUTPATIENT)
Facility: CLINIC | Age: 85
End: 2025-03-27
Payer: MEDICARE

## 2025-03-27 VITALS
SYSTOLIC BLOOD PRESSURE: 148 MMHG | RESPIRATION RATE: 18 BRPM | TEMPERATURE: 97.7 F | HEIGHT: 69 IN | WEIGHT: 146 LBS | BODY MASS INDEX: 21.62 KG/M2 | HEART RATE: 67 BPM | OXYGEN SATURATION: 97 % | DIASTOLIC BLOOD PRESSURE: 58 MMHG

## 2025-03-27 DIAGNOSIS — G62.9 NEUROPATHY: ICD-10-CM

## 2025-03-27 DIAGNOSIS — I10 ESSENTIAL (PRIMARY) HYPERTENSION: ICD-10-CM

## 2025-03-27 PROCEDURE — 1036F TOBACCO NON-USER: CPT | Performed by: NURSE PRACTITIONER

## 2025-03-27 PROCEDURE — 1123F ACP DISCUSS/DSCN MKR DOCD: CPT | Performed by: NURSE PRACTITIONER

## 2025-03-27 PROCEDURE — G8427 DOCREV CUR MEDS BY ELIG CLIN: HCPCS | Performed by: NURSE PRACTITIONER

## 2025-03-27 PROCEDURE — 1159F MED LIST DOCD IN RCRD: CPT | Performed by: NURSE PRACTITIONER

## 2025-03-27 PROCEDURE — G8420 CALC BMI NORM PARAMETERS: HCPCS | Performed by: NURSE PRACTITIONER

## 2025-03-27 PROCEDURE — 3078F DIAST BP <80 MM HG: CPT | Performed by: NURSE PRACTITIONER

## 2025-03-27 PROCEDURE — 3077F SYST BP >= 140 MM HG: CPT | Performed by: NURSE PRACTITIONER

## 2025-03-27 PROCEDURE — 99213 OFFICE O/P EST LOW 20 MIN: CPT | Performed by: NURSE PRACTITIONER

## 2025-03-27 PROCEDURE — 1160F RVW MEDS BY RX/DR IN RCRD: CPT | Performed by: NURSE PRACTITIONER

## 2025-03-27 RX ORDER — GABAPENTIN 300 MG/1
300 CAPSULE ORAL NIGHTLY
Qty: 90 CAPSULE | Refills: 0 | Status: SHIPPED | OUTPATIENT
Start: 2025-03-27 | End: 2025-06-25

## 2025-03-27 RX ORDER — LISINOPRIL AND HYDROCHLOROTHIAZIDE 12.5; 2 MG/1; MG/1
1 TABLET ORAL DAILY
Qty: 90 TABLET | Refills: 1 | Status: SHIPPED | OUTPATIENT
Start: 2025-03-27

## 2025-03-27 SDOH — ECONOMIC STABILITY: FOOD INSECURITY: WITHIN THE PAST 12 MONTHS, THE FOOD YOU BOUGHT JUST DIDN'T LAST AND YOU DIDN'T HAVE MONEY TO GET MORE.: NEVER TRUE

## 2025-03-27 SDOH — ECONOMIC STABILITY: FOOD INSECURITY: WITHIN THE PAST 12 MONTHS, YOU WORRIED THAT YOUR FOOD WOULD RUN OUT BEFORE YOU GOT MONEY TO BUY MORE.: NEVER TRUE

## 2025-03-27 ASSESSMENT — ENCOUNTER SYMPTOMS
CHOKING: 0
ABDOMINAL PAIN: 0
BLOOD IN STOOL: 0
NAUSEA: 0
FACIAL SWELLING: 0
ABDOMINAL DISTENTION: 0
BACK PAIN: 0
DIARRHEA: 0
EYE ITCHING: 0
COUGH: 0
EYE REDNESS: 0
SINUS PAIN: 0
EYE DISCHARGE: 0
CHEST TIGHTNESS: 0
SHORTNESS OF BREATH: 0
VOMITING: 0
TROUBLE SWALLOWING: 0
COLOR CHANGE: 0
PHOTOPHOBIA: 0
CONSTIPATION: 0
EYE PAIN: 0
APNEA: 0
WHEEZING: 0
SORE THROAT: 0
STRIDOR: 0

## 2025-03-27 ASSESSMENT — PATIENT HEALTH QUESTIONNAIRE - PHQ9
SUM OF ALL RESPONSES TO PHQ QUESTIONS 1-9: 2
1. LITTLE INTEREST OR PLEASURE IN DOING THINGS: SEVERAL DAYS
SUM OF ALL RESPONSES TO PHQ QUESTIONS 1-9: 2
2. FEELING DOWN, DEPRESSED OR HOPELESS: SEVERAL DAYS

## 2025-03-27 NOTE — TELEPHONE ENCOUNTER
Informed [facility] to resume the 300 mg RX of Gabapentin per YONNY Aquino NP because patient does not take medication during the day.

## 2025-03-27 NOTE — PROGRESS NOTES
Ed Gardner is a 82 y.o. male who presents to the office today for the following:    Chief Complaint   Patient presents with    Hypertension        Past Medical History:   Diagnosis Date    Benign prostatic hyperplasia 2017    COVID     Essential hypertension 2017    Full thickness rotator cuff tear 2017    Glaucoma of left eye 2017    Left inguinal hernia 2017    Stented coronary artery 3/24/2022    x 2          Past Surgical History:   Procedure Laterality Date    CARDIAC CATHETERIZATION  2022    CARDIAC PROCEDURE N/A 2024    Left heart cath / coronary angiography performed by Nohemi Edward MD at [facility] CARDIAC CATH LAB    CATARACT REMOVAL      CHOLECYSTECTOMY      COLONOSCOPY      HERNIA REPAIR  2016    Dr. Jimenez    HERNIA REPAIR Bilateral 2020    Inguinal-Dr. Jimenez    INVASIVE VASCULAR N/A 2024    Ultrasound guided vascular access performed by Nohemi Edward MD at [facility] CARDIAC CATH LAB    ORTHOPEDIC SURGERY Right     Shoulder    ORTHOPEDIC SURGERY Right 2018    Hammer toe surgery     ORTHOPEDIC SURGERY Left     Shoulder        Family History   Problem Relation Age of Onset    Heart Disease Mother     Cancer Brother         Social History     Socioeconomic History    Marital status:      Spouse name: None    Number of children: None    Years of education: None    Highest education level: None   Tobacco Use    Smoking status: Former     Current packs/day: 0.00     Types: Cigarettes     Quit date: 1980     Years since quittin.2    Smokeless tobacco: Never   Vaping Use    Vaping status: Never Used   Substance and Sexual Activity    Alcohol use: Never    Drug use: Never    Sexual activity: Not Currently     Partners: Female     Social Drivers of Health     Financial Resource Strain: Low Risk  (2024)    Overall Financial Resource Strain (CARDIA)     Difficulty of Paying Living Expenses: Not very hard   Food Insecurity: No Food

## 2025-03-27 NOTE — PROGRESS NOTES
Chief Complaint   Patient presents with    Hypertension     Follow up     Pt did not bring meds, went over list, pt confirmed     \"Have you been to the ER, urgent care clinic since your last visit?  Hospitalized since your last visit?\"    NO    “Have you seen or consulted any other health care providers outside our system since your last visit?”    NO

## 2025-03-27 NOTE — TELEPHONE ENCOUNTER
Pharmacist called and stated patient is receiving Gabapentin 300 mg per Dr. Johnathan Torres/Ortho which was filled on 03/12/2025.  He is to take 100 mg cap in the AM and 2 at HS. I told her to hold your RX until she heard back from us.  She stated it was too soon to fill it anyway.

## 2025-04-25 ENCOUNTER — OFFICE VISIT (OUTPATIENT)
Facility: CLINIC | Age: 85
End: 2025-04-25
Payer: MEDICARE

## 2025-04-25 VITALS
TEMPERATURE: 97.8 F | BODY MASS INDEX: 21.48 KG/M2 | RESPIRATION RATE: 20 BRPM | WEIGHT: 145 LBS | HEART RATE: 67 BPM | OXYGEN SATURATION: 97 % | DIASTOLIC BLOOD PRESSURE: 63 MMHG | HEIGHT: 69 IN | SYSTOLIC BLOOD PRESSURE: 133 MMHG

## 2025-04-25 DIAGNOSIS — I10 ESSENTIAL (PRIMARY) HYPERTENSION: Primary | ICD-10-CM

## 2025-04-25 DIAGNOSIS — G62.9 NEUROPATHY: ICD-10-CM

## 2025-04-25 PROCEDURE — 1123F ACP DISCUSS/DSCN MKR DOCD: CPT | Performed by: NURSE PRACTITIONER

## 2025-04-25 PROCEDURE — G8427 DOCREV CUR MEDS BY ELIG CLIN: HCPCS | Performed by: NURSE PRACTITIONER

## 2025-04-25 PROCEDURE — 3078F DIAST BP <80 MM HG: CPT | Performed by: NURSE PRACTITIONER

## 2025-04-25 PROCEDURE — 99213 OFFICE O/P EST LOW 20 MIN: CPT | Performed by: NURSE PRACTITIONER

## 2025-04-25 PROCEDURE — 1159F MED LIST DOCD IN RCRD: CPT | Performed by: NURSE PRACTITIONER

## 2025-04-25 PROCEDURE — 3075F SYST BP GE 130 - 139MM HG: CPT | Performed by: NURSE PRACTITIONER

## 2025-04-25 PROCEDURE — 1160F RVW MEDS BY RX/DR IN RCRD: CPT | Performed by: NURSE PRACTITIONER

## 2025-04-25 PROCEDURE — 1036F TOBACCO NON-USER: CPT | Performed by: NURSE PRACTITIONER

## 2025-04-25 PROCEDURE — G8420 CALC BMI NORM PARAMETERS: HCPCS | Performed by: NURSE PRACTITIONER

## 2025-04-25 ASSESSMENT — ENCOUNTER SYMPTOMS
VOMITING: 0
WHEEZING: 0
COLOR CHANGE: 0
EYE DISCHARGE: 0
CONSTIPATION: 0
BLOOD IN STOOL: 0
ABDOMINAL DISTENTION: 0
FACIAL SWELLING: 0
CHEST TIGHTNESS: 0
APNEA: 0
BACK PAIN: 0
SINUS PAIN: 0
STRIDOR: 0
SHORTNESS OF BREATH: 0
TROUBLE SWALLOWING: 0
NAUSEA: 0
DIARRHEA: 0
EYE ITCHING: 0
CHOKING: 0
ABDOMINAL PAIN: 0
EYE REDNESS: 0
SORE THROAT: 0
COUGH: 0
EYE PAIN: 0
PHOTOPHOBIA: 0

## 2025-04-25 NOTE — PROGRESS NOTES
Ed Gardner is a 82 y.o. male who presents to the office today for the following:    Chief Complaint   Patient presents with    Hypertension     4 week follow up         Past Medical History:   Diagnosis Date    Benign prostatic hyperplasia 2017    COVID     Essential hypertension 2017    Full thickness rotator cuff tear 2017    Glaucoma of left eye 2017    Left inguinal hernia 2017    Stented coronary artery 3/24/2022    x 2          Past Surgical History:   Procedure Laterality Date    CARDIAC CATHETERIZATION  2022    CARDIAC PROCEDURE N/A 2024    Left heart cath / coronary angiography performed by Nohemi Edward MD at [facility] CARDIAC CATH LAB    CATARACT REMOVAL      CHOLECYSTECTOMY      COLONOSCOPY      HERNIA REPAIR  2016    Dr. Jimenez    HERNIA REPAIR Bilateral 2020    Inguinal-Dr. Jimenez    INVASIVE VASCULAR N/A 2024    Ultrasound guided vascular access performed by Nohemi Edward MD at [facility] CARDIAC CATH LAB    ORTHOPEDIC SURGERY Right     Shoulder    ORTHOPEDIC SURGERY Right 2018    Hammer toe surgery     ORTHOPEDIC SURGERY Left     Shoulder        Family History   Problem Relation Age of Onset    Heart Disease Mother     Cancer Brother         Social History     Socioeconomic History    Marital status:      Spouse name: None    Number of children: None    Years of education: None    Highest education level: None   Tobacco Use    Smoking status: Former     Current packs/day: 0.00     Types: Cigarettes     Quit date: 1980     Years since quittin.3    Smokeless tobacco: Never   Vaping Use    Vaping status: Never Used   Substance and Sexual Activity    Alcohol use: Never    Drug use: Never    Sexual activity: Not Currently     Partners: Female     Social Drivers of Health     Financial Resource Strain: Low Risk  (2024)    Overall Financial Resource Strain (CARDIA)     Difficulty of Paying Living Expenses: Not very hard   Food

## 2025-04-25 NOTE — PROGRESS NOTES
Chief Complaint   Patient presents with    Hypertension     4 week follow up        Have you been to the ER, urgent care clinic since your last visit?  Hospitalized since your last visit?   NO    Have you seen or consulted any other health care providers outside our system since your last visit?   NO

## 2025-05-02 DIAGNOSIS — I10 ESSENTIAL (PRIMARY) HYPERTENSION: ICD-10-CM

## 2025-05-02 RX ORDER — ATORVASTATIN CALCIUM 40 MG/1
40 TABLET, FILM COATED ORAL DAILY
Qty: 90 TABLET | Refills: 1 | Status: SHIPPED | OUTPATIENT
Start: 2025-05-02

## 2025-05-22 ENCOUNTER — TELEPHONE (OUTPATIENT)
Facility: CLINIC | Age: 85
End: 2025-05-22

## 2025-05-22 DIAGNOSIS — N40.1 BENIGN PROSTATIC HYPERPLASIA WITH LOWER URINARY TRACT SYMPTOMS: ICD-10-CM

## 2025-05-22 RX ORDER — DUTASTERIDE AND TAMSULOSIN HYDROCHLORIDE CAPSULES .5; .4 MG/1; MG/1
1 CAPSULE ORAL DAILY
Qty: 90 CAPSULE | Refills: 1 | Status: SHIPPED | OUTPATIENT
Start: 2025-05-22

## 2025-06-04 ENCOUNTER — OFFICE VISIT (OUTPATIENT)
Facility: CLINIC | Age: 85
End: 2025-06-04
Payer: MEDICARE

## 2025-06-04 VITALS
SYSTOLIC BLOOD PRESSURE: 119 MMHG | RESPIRATION RATE: 20 BRPM | HEIGHT: 68 IN | OXYGEN SATURATION: 97 % | HEART RATE: 70 BPM | WEIGHT: 148 LBS | TEMPERATURE: 97.7 F | BODY MASS INDEX: 22.43 KG/M2 | DIASTOLIC BLOOD PRESSURE: 60 MMHG

## 2025-06-04 DIAGNOSIS — M54.2 CHRONIC NECK AND BACK PAIN: ICD-10-CM

## 2025-06-04 DIAGNOSIS — G89.29 CHRONIC NECK AND BACK PAIN: ICD-10-CM

## 2025-06-04 DIAGNOSIS — H40.9 GLAUCOMA OF BOTH EYES, UNSPECIFIED GLAUCOMA TYPE: ICD-10-CM

## 2025-06-04 DIAGNOSIS — E55.9 VITAMIN D DEFICIENCY: ICD-10-CM

## 2025-06-04 DIAGNOSIS — I10 ESSENTIAL (PRIMARY) HYPERTENSION: Primary | ICD-10-CM

## 2025-06-04 DIAGNOSIS — I25.10 ATHEROSCLEROSIS OF NATIVE CORONARY ARTERY OF NATIVE HEART WITHOUT ANGINA PECTORIS: ICD-10-CM

## 2025-06-04 DIAGNOSIS — R73.03 PREDIABETES: ICD-10-CM

## 2025-06-04 DIAGNOSIS — M54.9 CHRONIC NECK AND BACK PAIN: ICD-10-CM

## 2025-06-04 DIAGNOSIS — N40.1 BENIGN PROSTATIC HYPERPLASIA WITH LOWER URINARY TRACT SYMPTOMS, SYMPTOM DETAILS UNSPECIFIED: ICD-10-CM

## 2025-06-04 DIAGNOSIS — K21.9 GASTRO-ESOPHAGEAL REFLUX DISEASE WITHOUT ESOPHAGITIS: ICD-10-CM

## 2025-06-04 DIAGNOSIS — G62.9 POLYNEUROPATHY, UNSPECIFIED: ICD-10-CM

## 2025-06-04 DIAGNOSIS — M19.90 ARTHRITIS: ICD-10-CM

## 2025-06-04 DIAGNOSIS — R49.0 HOARSENESS OF VOICE: ICD-10-CM

## 2025-06-04 PROCEDURE — 1160F RVW MEDS BY RX/DR IN RCRD: CPT | Performed by: NURSE PRACTITIONER

## 2025-06-04 PROCEDURE — G8420 CALC BMI NORM PARAMETERS: HCPCS | Performed by: NURSE PRACTITIONER

## 2025-06-04 PROCEDURE — 1123F ACP DISCUSS/DSCN MKR DOCD: CPT | Performed by: NURSE PRACTITIONER

## 2025-06-04 PROCEDURE — 99214 OFFICE O/P EST MOD 30 MIN: CPT | Performed by: NURSE PRACTITIONER

## 2025-06-04 PROCEDURE — 3074F SYST BP LT 130 MM HG: CPT | Performed by: NURSE PRACTITIONER

## 2025-06-04 PROCEDURE — G8427 DOCREV CUR MEDS BY ELIG CLIN: HCPCS | Performed by: NURSE PRACTITIONER

## 2025-06-04 PROCEDURE — 1036F TOBACCO NON-USER: CPT | Performed by: NURSE PRACTITIONER

## 2025-06-04 PROCEDURE — 1159F MED LIST DOCD IN RCRD: CPT | Performed by: NURSE PRACTITIONER

## 2025-06-04 PROCEDURE — 3078F DIAST BP <80 MM HG: CPT | Performed by: NURSE PRACTITIONER

## 2025-06-04 ASSESSMENT — ENCOUNTER SYMPTOMS
ABDOMINAL PAIN: 0
DIARRHEA: 0
FACIAL SWELLING: 0
EYE PAIN: 0
TROUBLE SWALLOWING: 0
CHOKING: 0
BLOOD IN STOOL: 0
SORE THROAT: 0
EYE DISCHARGE: 0
COUGH: 0
COLOR CHANGE: 0
PHOTOPHOBIA: 0
EYE ITCHING: 0
VOMITING: 0
SINUS PAIN: 0
CHEST TIGHTNESS: 0
ABDOMINAL DISTENTION: 0
BACK PAIN: 0
WHEEZING: 0
APNEA: 0
EYE REDNESS: 0
NAUSEA: 0
STRIDOR: 0
SHORTNESS OF BREATH: 0
CONSTIPATION: 0

## 2025-06-04 NOTE — PROGRESS NOTES
Ed Gardner is a 82 y.o. male who presents to the office today for the following:    Chief Complaint   Patient presents with    Hypertension        Past Medical History:   Diagnosis Date    Benign prostatic hyperplasia 2017    COVID     Essential hypertension 2017    Full thickness rotator cuff tear 2017    Glaucoma of left eye 2017    Left inguinal hernia 2017    Stented coronary artery 3/24/2022    x 2          Past Surgical History:   Procedure Laterality Date    CARDIAC CATHETERIZATION  2022    CARDIAC PROCEDURE N/A 2024    Left heart cath / coronary angiography performed by Nohemi Edward MD at [facility] CARDIAC CATH LAB    CATARACT REMOVAL      CHOLECYSTECTOMY      COLONOSCOPY      HERNIA REPAIR  2016    Dr. Jimenez    HERNIA REPAIR Bilateral 2020    Inguinal-Dr. Jimenez    INVASIVE VASCULAR N/A 2024    Ultrasound guided vascular access performed by Nohemi Edward MD at [facility] CARDIAC CATH LAB    ORTHOPEDIC SURGERY Right     Shoulder    ORTHOPEDIC SURGERY Right 2018    Hammer toe surgery     ORTHOPEDIC SURGERY Left     Shoulder        Family History   Problem Relation Age of Onset    Heart Disease Mother     Cancer Brother         Social History     Socioeconomic History    Marital status:      Spouse name: None    Number of children: None    Years of education: None    Highest education level: None   Tobacco Use    Smoking status: Former     Current packs/day: 0.00     Types: Cigarettes     Quit date: 1980     Years since quittin.4    Smokeless tobacco: Never   Vaping Use    Vaping status: Never Used   Substance and Sexual Activity    Alcohol use: Never    Drug use: Never    Sexual activity: Not Currently     Partners: Female     Social Drivers of Health     Financial Resource Strain: Low Risk  (2024)    Overall Financial Resource Strain (CARDIA)     Difficulty of Paying Living Expenses: Not very hard   Food Insecurity: No Food

## 2025-06-09 ENCOUNTER — TELEPHONE (OUTPATIENT)
Facility: CLINIC | Age: 85
End: 2025-06-09

## 2025-06-09 LAB
BASOPHILS # BLD AUTO: 0 X10E3/UL (ref 0–0.2)
BASOPHILS NFR BLD AUTO: 1 %
EOSINOPHIL # BLD AUTO: 0.2 X10E3/UL (ref 0–0.4)
EOSINOPHIL NFR BLD AUTO: 4 %
ERYTHROCYTE [DISTWIDTH] IN BLOOD BY AUTOMATED COUNT: 12.4 % (ref 11.6–15.4)
HBA1C MFR BLD: 5.7 % (ref 4.8–5.6)
HCT VFR BLD AUTO: 40.1 % (ref 37.5–51)
HGB BLD-MCNC: 13.4 G/DL (ref 13–17.7)
IMM GRANULOCYTES # BLD AUTO: 0 X10E3/UL (ref 0–0.1)
IMM GRANULOCYTES NFR BLD AUTO: 0 %
LYMPHOCYTES # BLD AUTO: 1 X10E3/UL (ref 0.7–3.1)
LYMPHOCYTES NFR BLD AUTO: 24 %
MCH RBC QN AUTO: 33 PG (ref 26.6–33)
MCHC RBC AUTO-ENTMCNC: 33.4 G/DL (ref 31.5–35.7)
MCV RBC AUTO: 99 FL (ref 79–97)
MONOCYTES # BLD AUTO: 0.5 X10E3/UL (ref 0.1–0.9)
MONOCYTES NFR BLD AUTO: 12 %
NEUTROPHILS # BLD AUTO: 2.5 X10E3/UL (ref 1.4–7)
NEUTROPHILS NFR BLD AUTO: 59 %
PLATELET # BLD AUTO: 169 X10E3/UL (ref 150–450)
RBC # BLD AUTO: 4.06 X10E6/UL (ref 4.14–5.8)
WBC # BLD AUTO: 4.2 X10E3/UL (ref 3.4–10.8)

## 2025-06-09 NOTE — TELEPHONE ENCOUNTER
Patient is requesting a refill on the medication;    lisinopril-hydroCHLOROthiazide (PRINZIDE;ZESTORETIC) 20-12.5 MG per tablet     Pt's pharmacy is [facility]

## 2025-06-10 LAB
ALBUMIN SERPL-MCNC: 4.1 G/DL (ref 3.7–4.7)
ALP SERPL-CCNC: 90 IU/L (ref 44–121)
ALT SERPL-CCNC: 13 IU/L (ref 0–44)
APPEARANCE UR: CLEAR
AST SERPL-CCNC: 30 IU/L (ref 0–40)
BACTERIA #/AREA URNS HPF: NORMAL /[HPF]
BILIRUB SERPL-MCNC: 0.6 MG/DL (ref 0–1.2)
BILIRUB UR QL STRIP: NEGATIVE
BUN SERPL-MCNC: 17 MG/DL (ref 8–27)
BUN/CREAT SERPL: 13 (ref 10–24)
CALCIUM SERPL-MCNC: 9.6 MG/DL (ref 8.6–10.2)
CASTS URNS QL MICRO: NORMAL /LPF
CHLORIDE SERPL-SCNC: 102 MMOL/L (ref 96–106)
CHOLEST SERPL-MCNC: 105 MG/DL (ref 100–199)
CO2 SERPL-SCNC: 24 MMOL/L (ref 20–29)
COLOR UR: YELLOW
CREAT SERPL-MCNC: 1.27 MG/DL (ref 0.76–1.27)
EGFRCR SERPLBLD CKD-EPI 2021: 56 ML/MIN/1.73
EPI CELLS #/AREA URNS HPF: NORMAL /HPF (ref 0–10)
GLOBULIN SER CALC-MCNC: 1.9 G/DL (ref 1.5–4.5)
GLUCOSE SERPL-MCNC: 99 MG/DL (ref 70–99)
GLUCOSE UR QL STRIP: NEGATIVE
HDLC SERPL-MCNC: 56 MG/DL
HGB UR QL STRIP: NEGATIVE
KETONES UR QL STRIP: NEGATIVE
LDLC SERPL CALC-MCNC: 36 MG/DL (ref 0–99)
LEUKOCYTE ESTERASE UR QL STRIP: NEGATIVE
MICRO URNS: NORMAL
MICRO URNS: NORMAL
NITRITE UR QL STRIP: NEGATIVE
PH UR STRIP: 6.5 [PH] (ref 5–7.5)
POTASSIUM SERPL-SCNC: 3.9 MMOL/L (ref 3.5–5.2)
PROT SERPL-MCNC: 6 G/DL (ref 6–8.5)
PROT UR QL STRIP: NEGATIVE
PSA FREE MFR SERPL: 10.7 %
PSA FREE SERPL-MCNC: 0.49 NG/ML
PSA SERPL-MCNC: 4.6 NG/ML (ref 0–4)
RBC #/AREA URNS HPF: NORMAL /HPF (ref 0–2)
REFLEX CRITERIA: ABNORMAL
SODIUM SERPL-SCNC: 141 MMOL/L (ref 134–144)
SP GR UR STRIP: 1.01 (ref 1–1.03)
TRIGL SERPL-MCNC: 59 MG/DL (ref 0–149)
TSH SERPL DL<=0.005 MIU/L-ACNC: 2.04 UIU/ML (ref 0.45–4.5)
UROBILINOGEN UR STRIP-MCNC: 0.2 MG/DL (ref 0.2–1)
VLDLC SERPL CALC-MCNC: 13 MG/DL (ref 5–40)
WBC #/AREA URNS HPF: NORMAL /HPF (ref 0–5)

## 2025-06-16 ENCOUNTER — RESULTS FOLLOW-UP (OUTPATIENT)
Facility: CLINIC | Age: 85
End: 2025-06-16

## 2025-07-30 ENCOUNTER — HOSPITAL ENCOUNTER (OUTPATIENT)
Facility: HOSPITAL | Age: 85
Discharge: HOME | End: 2025-08-02
Payer: MEDICARE

## 2025-07-30 ENCOUNTER — OFFICE VISIT (OUTPATIENT)
Facility: CLINIC | Age: 85
End: 2025-07-30
Payer: MEDICARE

## 2025-07-30 VITALS
WEIGHT: 144 LBS | SYSTOLIC BLOOD PRESSURE: 176 MMHG | OXYGEN SATURATION: 97 % | TEMPERATURE: 98.4 F | HEIGHT: 68 IN | HEART RATE: 56 BPM | DIASTOLIC BLOOD PRESSURE: 83 MMHG | BODY MASS INDEX: 21.82 KG/M2

## 2025-07-30 DIAGNOSIS — M25.561 ACUTE PAIN OF RIGHT KNEE: ICD-10-CM

## 2025-07-30 DIAGNOSIS — I10 ESSENTIAL HYPERTENSION: ICD-10-CM

## 2025-07-30 DIAGNOSIS — M25.561 ACUTE PAIN OF RIGHT KNEE: Primary | ICD-10-CM

## 2025-07-30 PROCEDURE — 1160F RVW MEDS BY RX/DR IN RCRD: CPT | Performed by: NURSE PRACTITIONER

## 2025-07-30 PROCEDURE — 73560 X-RAY EXAM OF KNEE 1 OR 2: CPT

## 2025-07-30 PROCEDURE — 1159F MED LIST DOCD IN RCRD: CPT | Performed by: NURSE PRACTITIONER

## 2025-07-30 PROCEDURE — 1125F AMNT PAIN NOTED PAIN PRSNT: CPT | Performed by: NURSE PRACTITIONER

## 2025-07-30 PROCEDURE — G8420 CALC BMI NORM PARAMETERS: HCPCS | Performed by: NURSE PRACTITIONER

## 2025-07-30 PROCEDURE — 3079F DIAST BP 80-89 MM HG: CPT | Performed by: NURSE PRACTITIONER

## 2025-07-30 PROCEDURE — 1123F ACP DISCUSS/DSCN MKR DOCD: CPT | Performed by: NURSE PRACTITIONER

## 2025-07-30 PROCEDURE — 99213 OFFICE O/P EST LOW 20 MIN: CPT | Performed by: NURSE PRACTITIONER

## 2025-07-30 PROCEDURE — 3077F SYST BP >= 140 MM HG: CPT | Performed by: NURSE PRACTITIONER

## 2025-07-30 PROCEDURE — G8427 DOCREV CUR MEDS BY ELIG CLIN: HCPCS | Performed by: NURSE PRACTITIONER

## 2025-07-30 PROCEDURE — 1036F TOBACCO NON-USER: CPT | Performed by: NURSE PRACTITIONER

## 2025-07-30 RX ORDER — METHYLPREDNISOLONE 4 MG/1
TABLET ORAL
Qty: 1 KIT | Refills: 0 | Status: SHIPPED | OUTPATIENT
Start: 2025-07-30 | End: 2025-08-05

## (undated) DEVICE — SUTURE VCRL SZ 3-0 L27IN ABSRB UD L26MM SH 1/2 CIR J416H

## (undated) DEVICE — SUTURE MCRYL SZ 4-0 L27IN ABSRB UD L19MM PS-2 1/2 CIR PRIM Y426H

## (undated) DEVICE — Device

## (undated) DEVICE — SYRINGE MED 10ML RED POLYCARB BRL FIX M LUER CONN FLAT GRP

## (undated) DEVICE — SYRINGE MED 10ML LUERLOCK TIP W/O SFTY DISP

## (undated) DEVICE — Device: Brand: JELCO

## (undated) DEVICE — SYRINGE IRRIG 60ML SFT PLIABLE BLB EZ TO GRP 1 HND USE W/

## (undated) DEVICE — WASTEBAG DRIP/ADAPTER: Brand: MEDLINE INDUSTRIES, INC.

## (undated) DEVICE — GUIDEWIRE VASC L260CM DIA0.035IN TIP L3MM STD EXCHG PTFE J

## (undated) DEVICE — STERILE POLYISOPRENE POWDER-FREE SURGICAL GLOVES: Brand: PROTEXIS

## (undated) DEVICE — DEVON TUBE HOLDER FIXED TOUCH FASTEN STRAP: Brand: DEVON

## (undated) DEVICE — CATHETER DIAG 5FR L100CM LUMN ID0.047IN JL3.5 CRV 0 SIDE H

## (undated) DEVICE — GAUZE,SPONGE,4"X4",16PLY,STRL,LF,10/TRAY: Brand: MEDLINE

## (undated) DEVICE — PREP SKN CHLRAPRP APL 26ML STR --

## (undated) DEVICE — TOWEL,OR,DSP,ST,BLUE,STD,4/PK,20PK/CS: Brand: MEDLINE

## (undated) DEVICE — SYRINGE MED 10ML PUR GAM COMPATIBLE POLYCARB FIX M LUER CONN

## (undated) DEVICE — SUTURE VCRL SZ 2-0 L54IN ABSRB VLT POLYGLACTIN 910 COAT J615H

## (undated) DEVICE — DRAPE,REIN 53X77,STERILE: Brand: MEDLINE

## (undated) DEVICE — GOWN,SIRUS,POLYRNF,SETINSLV,XL,20/CS: Brand: MEDLINE

## (undated) DEVICE — GUIDEWIRE VASC J 1.5 MM 0.035 INX260 CM FIX EXCHANGE INQWIRE

## (undated) DEVICE — SPONGE LAP SOFT 18X18 IN X RAY DETECTABLE

## (undated) DEVICE — MAGNETIC INSTR DRAPE 20X16: Brand: MEDLINE INDUSTRIES, INC.

## (undated) DEVICE — INTENDED FOR TISSUE SEPARATION, AND OTHER PROCEDURES THAT REQUIRE A SHARP SURGICAL BLADE TO PUNCTURE OR CUT.: Brand: BARD-PARKER ® CARBON RIB-BACK BLADES

## (undated) DEVICE — 1200CC GUARDIAN II: Brand: GUARDIAN

## (undated) DEVICE — COUNTER NDL 40 COUNT HLD 70 FOAM BLK ADH W/ MAG

## (undated) DEVICE — YANKAUER,BULB TIP,W/O VENT,RIGID,STERILE: Brand: MEDLINE

## (undated) DEVICE — DRAPE,MINOR PROC,6X6 FEN, STER: Brand: MEDLINE

## (undated) DEVICE — 3M™ STERI-DRAPE™ SMALL DRAPE WITH ADHESIVE APERTURE 1092 25/BX,4/CS&#X20;: Brand: STERI-DRAPE™

## (undated) DEVICE — SPONGE GZ 4X4 IN 16-PLY DETECTABLE W/ DMT MSTR TAG

## (undated) DEVICE — RADIFOCUS GLIDEWIRE: Brand: GLIDEWIRE

## (undated) DEVICE — PACK,SET-UP: Brand: MEDLINE

## (undated) DEVICE — NEEDLE HYPO 25GA L1.5IN BVL ORIENTED ECLIPSE

## (undated) DEVICE — BOWL MED M 16OZ PLAS CAP GRAD

## (undated) DEVICE — SYRINGE 20ML LL S/C 50

## (undated) DEVICE — SUTURE PDS II SZ 2-0 L27IN ABSRB VLT SH L26MM 1/2 CIR Z317H

## (undated) DEVICE — SC 3W MP RA OFF PB - PG: Brand: NAMIC

## (undated) DEVICE — DERMABOND SKIN ADH 0.7ML -- DERMABOND ADVANCED 12/BX

## (undated) DEVICE — SYR 10ML LUER LOK 1/5ML GRAD --

## (undated) DEVICE — SMARTGOWN SURGICAL GOWN, LARGE: Brand: CONVERTORS

## (undated) DEVICE — 48" PROBE COVER W/GEL, ULTRASOUND, STERILE: Brand: SITE-RITE

## (undated) DEVICE — ROCKER SWITCH PENCIL BLADE ELECTRODE, HOLSTER: Brand: EDGE

## (undated) DEVICE — SURGICAL PROCEDURE TRAY CRD CATH 3 PRT

## (undated) DEVICE — 3M™ TEGADERM™ TRANSPARENT FILM DRESSING FRAME STYLE WITH BORDER, 1616, 4 IN X 4-3/4 IN (10 CM X 12 CM), 50/CT 4CT/CASE: Brand: 3M™ TEGADERM™

## (undated) DEVICE — BAND COMPR L24CM REG CLR PLAS HEMSTAT EXT HK AND LOOP RETEN

## (undated) DEVICE — REM POLYHESIVE ADULT PATIENT RETURN ELECTRODE: Brand: VALLEYLAB

## (undated) DEVICE — TUBING, SUCTION, 3/16" X 10', SCALLOP: Brand: MEDLINE

## (undated) DEVICE — MARKER SKN REG TIP W/RULER -- STRL

## (undated) DEVICE — GLIDESHEATH SLENDER STAINLESS STEEL KIT: Brand: GLIDESHEATH SLENDER

## (undated) DEVICE — SUTURE VCRL SZ 2-0 L27IN ABSRB UD L26MM SH 1/2 CIR J417H

## (undated) DEVICE — PENROSE DRAIN 18 X .5" SILICONE: Brand: MEDLINE